# Patient Record
Sex: FEMALE | Race: WHITE | NOT HISPANIC OR LATINO | Employment: OTHER | URBAN - METROPOLITAN AREA
[De-identification: names, ages, dates, MRNs, and addresses within clinical notes are randomized per-mention and may not be internally consistent; named-entity substitution may affect disease eponyms.]

---

## 2017-01-13 ENCOUNTER — OFFICE VISIT - RIVER FALLS (OUTPATIENT)
Dept: FAMILY MEDICINE | Facility: CLINIC | Age: 47
End: 2017-01-13
Payer: MEDICAID

## 2017-01-13 ASSESSMENT — MIFFLIN-ST. JEOR: SCORE: 1275.66

## 2017-05-15 ENCOUNTER — OFFICE VISIT - RIVER FALLS (OUTPATIENT)
Dept: FAMILY MEDICINE | Facility: CLINIC | Age: 47
End: 2017-05-15
Payer: MEDICAID

## 2017-05-15 ENCOUNTER — COMMUNICATION - RIVER FALLS (OUTPATIENT)
Dept: FAMILY MEDICINE | Facility: CLINIC | Age: 47
End: 2017-05-15
Payer: MEDICAID

## 2017-05-15 ASSESSMENT — MIFFLIN-ST. JEOR: SCORE: 1238.46

## 2017-05-17 LAB
CREAT SERPL-MCNC: 0.77 MG/DL (ref 0.5–1.1)
GLUCOSE BLD-MCNC: 90 MG/DL (ref 65–99)

## 2017-08-03 ENCOUNTER — TELEPHONE (OUTPATIENT)
Dept: CARDIOLOGY | Facility: CLINIC | Age: 47
End: 2017-08-03

## 2017-08-03 NOTE — LETTER
August 3, 2017      TO: Lucrecia Connors  [address]  [facility] [address]       Dear Lucrecia,    Our records indicate that it is time for you to schedule your return visit with the [facility] in the CARDIOVASCULAR CONGENITAL CLINIC at the [facility] ([facility]).      To make your appointment, please call: [phone], Monday - Friday, 8 A.M. - 4:30 P.M (Central Time Zone).    Please check with your insurance company about your benefits.  Some insurance plans provide different coverage levels for services at [facility] hospital-based clinics.     We look forward to hearing from you.    Sincerely,    Herbert Garcia  Clinic Coordinator  [facility]  Office: [phone]  Fax: [phone]

## 2018-03-05 DIAGNOSIS — Q21.10 ASD (ATRIAL SEPTAL DEFECT): Primary | ICD-10-CM

## 2018-03-29 DIAGNOSIS — Q21.10 ASD (ATRIAL SEPTAL DEFECT): Primary | ICD-10-CM

## 2018-03-30 ENCOUNTER — HOSPITAL ENCOUNTER (OUTPATIENT)
Dept: MRI IMAGING | Facility: CLINIC | Age: 48
Discharge: HOME | End: 2018-03-30
Attending: INTERNAL MEDICINE | Admitting: INTERNAL MEDICINE
Payer: MEDICAID

## 2018-03-30 ENCOUNTER — OFFICE VISIT (OUTPATIENT)
Dept: CARDIOLOGY | Facility: CLINIC | Age: 48
End: 2018-03-30
Attending: INTERNAL MEDICINE
Payer: MEDICAID

## 2018-03-30 VITALS
WEIGHT: 144.5 LBS | HEART RATE: 74 BPM | DIASTOLIC BLOOD PRESSURE: 77 MMHG | BODY MASS INDEX: 24.07 KG/M2 | SYSTOLIC BLOOD PRESSURE: 116 MMHG | OXYGEN SATURATION: 98 % | HEIGHT: 65 IN

## 2018-03-30 DIAGNOSIS — Z87.74 H/O ATRIAL SEPTAL DEFECT REPAIR: ICD-10-CM

## 2018-03-30 DIAGNOSIS — Q21.10 ASD (ATRIAL SEPTAL DEFECT): ICD-10-CM

## 2018-03-30 DIAGNOSIS — I51.7 ENLARGED RV (RIGHT VENTRICLE): ICD-10-CM

## 2018-03-30 DIAGNOSIS — Q21.10 ASD (ATRIAL SEPTAL DEFECT): Primary | ICD-10-CM

## 2018-03-30 LAB
ALBUMIN SERPL-MCNC: 3.8 G/DL (ref 3.4–5)
ALP SERPL-CCNC: 54 U/L (ref 40–150)
ALT SERPL W P-5'-P-CCNC: 40 U/L (ref 0–50)
ANION GAP SERPL CALCULATED.3IONS-SCNC: 8 MMOL/L (ref 3–14)
AST SERPL W P-5'-P-CCNC: 29 U/L (ref 0–45)
BASOPHILS # BLD AUTO: 0.1 10E9/L (ref 0–0.2)
BASOPHILS NFR BLD AUTO: 1.3 %
BILIRUB SERPL-MCNC: 0.5 MG/DL (ref 0.2–1.3)
BUN SERPL-MCNC: 19 MG/DL (ref 7–30)
CALCIUM SERPL-MCNC: 9.1 MG/DL (ref 8.5–10.1)
CHLORIDE SERPL-SCNC: 107 MMOL/L (ref 94–109)
CHOLEST SERPL-MCNC: 116 MG/DL
CO2 SERPL-SCNC: 24 MMOL/L (ref 20–32)
CREAT SERPL-MCNC: 0.83 MG/DL (ref 0.52–1.04)
DIFFERENTIAL METHOD BLD: NORMAL
EOSINOPHIL # BLD AUTO: 0.1 10E9/L (ref 0–0.7)
EOSINOPHIL NFR BLD AUTO: 1.5 %
ERYTHROCYTE [DISTWIDTH] IN BLOOD BY AUTOMATED COUNT: 11.9 % (ref 10–15)
GFR SERPL CREATININE-BSD FRML MDRD: 74 ML/MIN/1.7M2
GLUCOSE SERPL-MCNC: 90 MG/DL (ref 70–99)
HCT VFR BLD AUTO: 42.5 % (ref 35–47)
HDLC SERPL-MCNC: 58 MG/DL
HGB BLD-MCNC: 14.1 G/DL (ref 11.7–15.7)
IMM GRANULOCYTES # BLD: 0 10E9/L (ref 0–0.4)
IMM GRANULOCYTES NFR BLD: 0.4 %
LDLC SERPL CALC-MCNC: 51 MG/DL
LYMPHOCYTES # BLD AUTO: 1.2 10E9/L (ref 0.8–5.3)
LYMPHOCYTES NFR BLD AUTO: 22.2 %
MCH RBC QN AUTO: 30.1 PG (ref 26.5–33)
MCHC RBC AUTO-ENTMCNC: 33.2 G/DL (ref 31.5–36.5)
MCV RBC AUTO: 91 FL (ref 78–100)
MONOCYTES # BLD AUTO: 0.5 10E9/L (ref 0–1.3)
MONOCYTES NFR BLD AUTO: 9.7 %
NEUTROPHILS # BLD AUTO: 3.5 10E9/L (ref 1.6–8.3)
NEUTROPHILS NFR BLD AUTO: 64.9 %
NONHDLC SERPL-MCNC: 59 MG/DL
NRBC # BLD AUTO: 0 10*3/UL
NRBC BLD AUTO-RTO: 0 /100
PLATELET # BLD AUTO: 194 10E9/L (ref 150–450)
POTASSIUM SERPL-SCNC: 4 MMOL/L (ref 3.4–5.3)
PROT SERPL-MCNC: 7.9 G/DL (ref 6.8–8.8)
RBC # BLD AUTO: 4.68 10E12/L (ref 3.8–5.2)
SODIUM SERPL-SCNC: 139 MMOL/L (ref 133–144)
T4 FREE SERPL-MCNC: 1.24 NG/DL (ref 0.76–1.46)
TRIGL SERPL-MCNC: 41 MG/DL
TSH SERPL DL<=0.005 MIU/L-ACNC: 4.16 MU/L (ref 0.4–4)
WBC # BLD AUTO: 5.4 10E9/L (ref 4–11)

## 2018-03-30 PROCEDURE — 75561 CARDIAC MRI FOR MORPH W/DYE: CPT

## 2018-03-30 PROCEDURE — A9585 GADOBUTROL INJECTION: HCPCS | Performed by: INTERNAL MEDICINE

## 2018-03-30 PROCEDURE — 85025 COMPLETE CBC W/AUTO DIFF WBC: CPT | Performed by: INTERNAL MEDICINE

## 2018-03-30 PROCEDURE — 84439 ASSAY OF FREE THYROXINE: CPT | Performed by: INTERNAL MEDICINE

## 2018-03-30 PROCEDURE — 75565 CARD MRI VELOC FLOW MAPPING: CPT | Mod: 26 | Performed by: INTERNAL MEDICINE

## 2018-03-30 PROCEDURE — 93010 ELECTROCARDIOGRAM REPORT: CPT | Mod: ZP | Performed by: INTERNAL MEDICINE

## 2018-03-30 PROCEDURE — 99214 OFFICE O/P EST MOD 30 MIN: CPT | Mod: GC | Performed by: INTERNAL MEDICINE

## 2018-03-30 PROCEDURE — G0463 HOSPITAL OUTPT CLINIC VISIT: HCPCS | Mod: 25,ZF

## 2018-03-30 PROCEDURE — 93005 ELECTROCARDIOGRAM TRACING: CPT | Mod: ZF

## 2018-03-30 PROCEDURE — 25000128 H RX IP 250 OP 636: Performed by: INTERNAL MEDICINE

## 2018-03-30 PROCEDURE — 84443 ASSAY THYROID STIM HORMONE: CPT | Performed by: INTERNAL MEDICINE

## 2018-03-30 PROCEDURE — 80053 COMPREHEN METABOLIC PANEL: CPT | Performed by: INTERNAL MEDICINE

## 2018-03-30 PROCEDURE — 75561 CARDIAC MRI FOR MORPH W/DYE: CPT | Mod: 26 | Performed by: INTERNAL MEDICINE

## 2018-03-30 PROCEDURE — 80061 LIPID PANEL: CPT | Performed by: INTERNAL MEDICINE

## 2018-03-30 PROCEDURE — 36415 COLL VENOUS BLD VENIPUNCTURE: CPT | Performed by: INTERNAL MEDICINE

## 2018-03-30 RX ORDER — GADOBUTROL 604.72 MG/ML
10 INJECTION INTRAVENOUS ONCE
Status: COMPLETED | OUTPATIENT
Start: 2018-03-30 | End: 2018-03-30

## 2018-03-30 RX ADMIN — GADOBUTROL 10 ML: 604.72 INJECTION INTRAVENOUS at 08:18

## 2018-03-30 ASSESSMENT — PAIN SCALES - GENERAL: PAINLEVEL: NO PAIN (0)

## 2018-03-30 NOTE — PROGRESS NOTES
HPI:    Mrs. Connors is a 47-year-old females with past medical history of atrial septal defect status post surgical patch repair in 1993 at 23 years of age. She was initially diagnosed after auscultation of heart murmur prompted an echocardiogram which revealed her atrial septal defect.  Her surgical course was uncomplicated and she was subsequently discharged.  She was unaware that she may require cardiology follow-up following surgical repair and as such had not been evaluated to cardiology for a long period of time until establishing care with Dr. Valerio.  She presents to she is otherwise in good health [facility] clinic today for follow-up of atrial septal defect status post repair.  And only other comorbidity is Crohn's colitis for which she does not wish to see gastroenterology and is managed with nutrition alone.  There has been a discussion with a genetic counselor in the past regarding genetic testing for conditions that may be associated with atrial septal defects given that she has had 4 children now with ASDs and that 3 of them have subsequently required device closure, however this has been deferred so far. She denies any chest pain, shortness of breath, dyspnea on exertion, paroxysmal nocturnal dyspnea, orthopnea, lower extremity edema, palpitations, dizziness, lightheadedness, syncope.  She does not feel limited in the degree of exercise that she is able to perform.  However, he does not perform dedicated exercise.       PAST MEDICAL HISTORY:  Past Medical History:   Diagnosis Date     ASD (atrial septal defect)     s/p patch repair in 1993     Contact dermatitis and other eczema, due to unspecified cause    - Chron's Disease    Family History   Problem Relation Age of Onset     HEART DISEASE Son      ASD     HEART DISEASE Daughter      ASD     HEART DISEASE Son      ASD     CURRENT MEDICATIONS:    No current outpatient prescriptions on file prior to visit.  Current Facility-Administered Medications on File  "Prior to Visit:  [COMPLETED] gadobutrol (GADAVIST) injection 10 mL     NKDA    ROS:   Per HPI, otherwise reviewed and negative x 12.      EXAM:  Vitals: /77  Pulse 74  Ht 5' 5\" (165.1 cm)  Wt 144 lb 8 oz (65.5 kg)  SpO2 98%  BMI 24.05 kg/m2  General: appears comfortable, alert  Head: normocephalic, atraumatic  Eyes: anicteric sclera, EOMI  Neck: no adenopathy or masses, 2+ carotids without bruits  Orophyarynx: moist mucosa, no lesions, dentition intact  Heart: regular, S1/S2, short 2/6, no murmur, gallop, or rub, estimated JVP 6 cm  Lungs: clear, no rales or wheezing  Abdomen: soft, non-tender, bowel sounds present, no hepatomegaly  Extremities: no clubbing, cyanosis or edema  Neurological: normal speech and affect, no gross motor deficits    Labs:  Orders Only on 03/30/2018   Component Date Value Ref Range Status     TSH 03/30/2018 4.16* 0.40 - 4.00 mU/L Final     Sodium 03/30/2018 139  133 - 144 mmol/L Final     Potassium 03/30/2018 4.0  3.4 - 5.3 mmol/L Final     Chloride 03/30/2018 107  94 - 109 mmol/L Final     Carbon Dioxide 03/30/2018 24  20 - 32 mmol/L Final     Anion Gap 03/30/2018 8  3 - 14 mmol/L Final     Glucose 03/30/2018 90  70 - 99 mg/dL Final     Urea Nitrogen 03/30/2018 19  7 - 30 mg/dL Final     Creatinine 03/30/2018 0.83  0.52 - 1.04 mg/dL Final     GFR Estimate 03/30/2018 74  >60 mL/min/1.7m2 Final    Non  GFR Calc     GFR Estimate If Black 03/30/2018 89  >60 mL/min/1.7m2 Final    African American GFR Calc     Calcium 03/30/2018 9.1  8.5 - 10.1 mg/dL Final     Bilirubin Total 03/30/2018 0.5  0.2 - 1.3 mg/dL Final     Albumin 03/30/2018 3.8  3.4 - 5.0 g/dL Final     Protein Total 03/30/2018 7.9  6.8 - 8.8 g/dL Final     Alkaline Phosphatase 03/30/2018 54  40 - 150 U/L Final     ALT 03/30/2018 40  0 - 50 U/L Final     AST 03/30/2018 29  0 - 45 U/L Final     WBC 03/30/2018 5.4  4.0 - 11.0 10e9/L Final     RBC Count 03/30/2018 4.68  3.8 - 5.2 10e12/L Final     Hemoglobin " 03/30/2018 14.1  11.7 - 15.7 g/dL Final     Hematocrit 03/30/2018 42.5  35.0 - 47.0 % Final     MCV 03/30/2018 91  78 - 100 fl Final     MCH 03/30/2018 30.1  26.5 - 33.0 pg Final     MCHC 03/30/2018 33.2  31.5 - 36.5 g/dL Final     RDW 03/30/2018 11.9  10.0 - 15.0 % Final     Platelet Count 03/30/2018 194  150 - 450 10e9/L Final     Diff Method 03/30/2018 Automated Method   Final     % Neutrophils 03/30/2018 64.9  % Final     % Lymphocytes 03/30/2018 22.2  % Final     % Monocytes 03/30/2018 9.7  % Final     % Eosinophils 03/30/2018 1.5  % Final     % Basophils 03/30/2018 1.3  % Final     % Immature Granulocytes 03/30/2018 0.4  % Final     Nucleated RBCs 03/30/2018 0  0 /100 Final     Absolute Neutrophil 03/30/2018 3.5  1.6 - 8.3 10e9/L Final     Absolute Lymphocytes 03/30/2018 1.2  0.8 - 5.3 10e9/L Final     Absolute Monocytes 03/30/2018 0.5  0.0 - 1.3 10e9/L Final     Absolute Eosinophils 03/30/2018 0.1  0.0 - 0.7 10e9/L Final     Absolute Basophils 03/30/2018 0.1  0.0 - 0.2 10e9/L Final     Abs Immature Granulocytes 03/30/2018 0.0  0 - 0.4 10e9/L Final     Absolute Nucleated RBC 03/30/2018 0.0   Final     Cholesterol 03/30/2018 116  <200 mg/dL Final     Triglycerides 03/30/2018 41  <150 mg/dL Final     HDL Cholesterol 03/30/2018 58  >49 mg/dL Final     LDL Cholesterol Calculated 03/30/2018 51  <100 mg/dL Final    Desirable:       <100 mg/dl     Non HDL Cholesterol 03/30/2018 59  <130 mg/dL Final     T4 Free 03/30/2018 1.24  0.76 - 1.46 ng/dL Final       Echo (4/8/14):   Status post surgical closure of atrial septal defect. Mild right   ventricular enlargement. Normal ventricular contractility. Mild   (1-2+) tricuspid insufficiency with normal right sided pressures. No   evidence of shunts. No evidence of pulmonary hypertension    MRI (3/30/18):  Preliminary read: Normal left ventricular systolic function, normal right ventricular systolic function, right ventricular enlargement is present.  Mitral valve prolapse  is present without significant regurgitation.    EKG (3/30/18): Normal sinus rhythm    Assessment and Plan:    Mrs. Connors is a 47-year-old female with past medical history of a atrial septal defect status post surgical repair and mitral valve prolapse in 1993 who is clinically doing well.  There is no signs of pulmonary hypertension on most recent echocardiogram and she is not complaining of any symptoms consistent with arrhythmia.  Her most recent holter monitor was lost and as such we'd like to replace one to evaluate for arrythmias for which she is at risk following her ASD repair.  Mrs. Connors has declined genetics testing, however we recommended informing her children that a genetic predisposition may exist given the high frequency of ASDs present in her family and that her children should be offered the opportunity for genetic testing.  She does not need longterm adult congenital follow up unless any questions arise, however she should continue to follow up with a cardiologist given her mitral valve prolapse for which we recommend repeat echocardiogram in 5 years.      Dallin Sarabia DO  Pediatric Cardiology Fellow    I have reviewed today's vital signs, notes, medications, labs and imaging. I have also seen and examined the patient and agree with the findings and plan as outlined above.    Latesha Barnard MD  Section Head - Advanced Heart Failure, Transplantation and Mechanical Circulatory Support  Co-Director - Adult Congenital and Cardiovascular Genetics Center  Associate Professor of Medicine, [employer] [facility]

## 2018-03-30 NOTE — NURSING NOTE
Cardiac Testing: Patient given instructions regarding  echocardiogram . Discussed purpose, preparation, procedure and when to expect results reported back to the patient. Patient demonstrated understanding of this information and agreed to call with further questions or concerns.    Labs: Patient was given results of the laboratory testing obtained today. Patient demonstrated understanding of this information and agreed to call with further questions or concerns.     Med Reconcile: Reviewed and verified all current medications with the patient. The updated medication list was printed and given to the patient.    Return Appointment: Follow up with general cardiology in 5 years with an echo. Patient given instructions regarding scheduling next clinic visit. Patient demonstrated understanding of this information and agreed to call with further questions or concerns.    Patient stated she understood all health information given and agreed to call with further questions or concerns.    Cameron Leiva, RN  RN Care Coordinator  [facility]  [phone]

## 2018-03-30 NOTE — LETTER
3/30/2018      RE: Lucrecia Connors  [address]  [facility] [address]       Dear Colleague,    Thank you for the opportunity to participate in the care of your patient, Lucrecia Connors, at the [facility]. Please see a copy of my visit note below.    HPI:    Mrs. Connors is a 47-year-old females with past medical history of atrial septal defect status post surgical patch repair in 1993 at 23 years of age. She was initially diagnosed after auscultation of heart murmur prompted an echocardiogram which revealed her atrial septal defect.  Her surgical course was uncomplicated and she was subsequently discharged.  She was unaware that she may require cardiology follow-up following surgical repair and as such had not been evaluated to cardiology for a long period of time until establishing care with Dr. Valerio.  She presents to she is otherwise in good health [facility] clinic today for follow-up of atrial septal defect status post repair.  And only other comorbidity is Crohn's colitis for which she does not wish to see gastroenterology and is managed with nutrition alone.  There has been a discussion with a genetic counselor in the past regarding genetic testing for conditions that may be associated with atrial septal defects given that she has had 4 children now with ASDs and that 3 of them have subsequently required device closure, however this has been deferred so far. She denies any chest pain, shortness of breath, dyspnea on exertion, paroxysmal nocturnal dyspnea, orthopnea, lower extremity edema, palpitations, dizziness, lightheadedness, syncope.  She does not feel limited in the degree of exercise that she is able to perform.  However, he does not perform dedicated exercise.       PAST MEDICAL HISTORY:  Past Medical History:   Diagnosis Date     ASD (atrial septal defect)     s/p patch repair in 1993     Contact dermatitis and other eczema, due to  "unspecified cause    - Chron's Disease    Family History   Problem Relation Age of Onset     HEART DISEASE Son      ASD     HEART DISEASE Daughter      ASD     HEART DISEASE Son      ASD     CURRENT MEDICATIONS:    No current outpatient prescriptions on file prior to visit.  Current Facility-Administered Medications on File Prior to Visit:  [COMPLETED] gadobutrol (GADAVIST) injection 10 mL     NKDA    ROS:   Per HPI, otherwise reviewed and negative x 12.      EXAM:  Vitals: /77  Pulse 74  Ht 5' 5\" (165.1 cm)  Wt 144 lb 8 oz (65.5 kg)  SpO2 98%  BMI 24.05 kg/m2  General: appears comfortable, alert  Head: normocephalic, atraumatic  Eyes: anicteric sclera, EOMI  Neck: no adenopathy or masses, 2+ carotids without bruits  Orophyarynx: moist mucosa, no lesions, dentition intact  Heart: regular, S1/S2, short 2/6, no murmur, gallop, or rub, estimated JVP 6 cm  Lungs: clear, no rales or wheezing  Abdomen: soft, non-tender, bowel sounds present, no hepatomegaly  Extremities: no clubbing, cyanosis or edema  Neurological: normal speech and affect, no gross motor deficits    Labs:  Orders Only on 03/30/2018   Component Date Value Ref Range Status     TSH 03/30/2018 4.16* 0.40 - 4.00 mU/L Final     Sodium 03/30/2018 139  133 - 144 mmol/L Final     Potassium 03/30/2018 4.0  3.4 - 5.3 mmol/L Final     Chloride 03/30/2018 107  94 - 109 mmol/L Final     Carbon Dioxide 03/30/2018 24  20 - 32 mmol/L Final     Anion Gap 03/30/2018 8  3 - 14 mmol/L Final     Glucose 03/30/2018 90  70 - 99 mg/dL Final     Urea Nitrogen 03/30/2018 19  7 - 30 mg/dL Final     Creatinine 03/30/2018 0.83  0.52 - 1.04 mg/dL Final     GFR Estimate 03/30/2018 74  >60 mL/min/1.7m2 Final    Non  GFR Calc     GFR Estimate If Black 03/30/2018 89  >60 mL/min/1.7m2 Final    African American GFR Calc     Calcium 03/30/2018 9.1  8.5 - 10.1 mg/dL Final     Bilirubin Total 03/30/2018 0.5  0.2 - 1.3 mg/dL Final     Albumin 03/30/2018 3.8  3.4 - " 5.0 g/dL Final     Protein Total 03/30/2018 7.9  6.8 - 8.8 g/dL Final     Alkaline Phosphatase 03/30/2018 54  40 - 150 U/L Final     ALT 03/30/2018 40  0 - 50 U/L Final     AST 03/30/2018 29  0 - 45 U/L Final     WBC 03/30/2018 5.4  4.0 - 11.0 10e9/L Final     RBC Count 03/30/2018 4.68  3.8 - 5.2 10e12/L Final     Hemoglobin 03/30/2018 14.1  11.7 - 15.7 g/dL Final     Hematocrit 03/30/2018 42.5  35.0 - 47.0 % Final     MCV 03/30/2018 91  78 - 100 fl Final     MCH 03/30/2018 30.1  26.5 - 33.0 pg Final     MCHC 03/30/2018 33.2  31.5 - 36.5 g/dL Final     RDW 03/30/2018 11.9  10.0 - 15.0 % Final     Platelet Count 03/30/2018 194  150 - 450 10e9/L Final     Diff Method 03/30/2018 Automated Method   Final     % Neutrophils 03/30/2018 64.9  % Final     % Lymphocytes 03/30/2018 22.2  % Final     % Monocytes 03/30/2018 9.7  % Final     % Eosinophils 03/30/2018 1.5  % Final     % Basophils 03/30/2018 1.3  % Final     % Immature Granulocytes 03/30/2018 0.4  % Final     Nucleated RBCs 03/30/2018 0  0 /100 Final     Absolute Neutrophil 03/30/2018 3.5  1.6 - 8.3 10e9/L Final     Absolute Lymphocytes 03/30/2018 1.2  0.8 - 5.3 10e9/L Final     Absolute Monocytes 03/30/2018 0.5  0.0 - 1.3 10e9/L Final     Absolute Eosinophils 03/30/2018 0.1  0.0 - 0.7 10e9/L Final     Absolute Basophils 03/30/2018 0.1  0.0 - 0.2 10e9/L Final     Abs Immature Granulocytes 03/30/2018 0.0  0 - 0.4 10e9/L Final     Absolute Nucleated RBC 03/30/2018 0.0   Final     Cholesterol 03/30/2018 116  <200 mg/dL Final     Triglycerides 03/30/2018 41  <150 mg/dL Final     HDL Cholesterol 03/30/2018 58  >49 mg/dL Final     LDL Cholesterol Calculated 03/30/2018 51  <100 mg/dL Final    Desirable:       <100 mg/dl     Non HDL Cholesterol 03/30/2018 59  <130 mg/dL Final     T4 Free 03/30/2018 1.24  0.76 - 1.46 ng/dL Final       Echo (4/8/14):   Status post surgical closure of atrial septal defect. Mild right   ventricular enlargement. Normal ventricular contractility.  Mild   (1-2+) tricuspid insufficiency with normal right sided pressures. No   evidence of shunts. No evidence of pulmonary hypertension    MRI (3/30/18):  Preliminary read: Normal left ventricular systolic function, normal right ventricular systolic function, right ventricular enlargement is present.  Mitral valve prolapse is present without significant regurgitation.    EKG (3/30/18): Normal sinus rhythm    Assessment and Plan:    Mrs. Connors is a 47-year-old female with past medical history of a atrial septal defect status post surgical repair and mitral valve prolapse in 1993 who is clinically doing well.  There is no signs of pulmonary hypertension on most recent echocardiogram and she is not complaining of any symptoms consistent with arrhythmia.  Her most recent holter monitor was lost and as such we'd like to replace one to evaluate for arrythmias for which she is at risk following her ASD repair.  Mrs. Connors has declined genetics testing, however we recommended informing her children that a genetic predisposition may exist given the high frequency of ASDs present in her family and that her children should be offered the opportunity for genetic testing.  She does not need longterm adult congenital follow up unless any questions arise, however she should continue to follow up with a cardiologist given her mitral valve prolapse for which we recommend repeat echocardiogram in 5 years.      Dallin Sarabia DO  Pediatric Cardiology Fellow    I have reviewed today's vital signs, notes, medications, labs and imaging. I have also seen and examined the patient and agree with the findings and plan as outlined above.    Latesha Barnard MD  Section Head - Advanced Heart Failure, Transplantation and Mechanical Circulatory Support  Co-Director - Adult Congenital and Cardiovascular Genetics Center  Associate Professor of Medicine, [employer] [facility]

## 2018-03-30 NOTE — MR AVS SNAPSHOT
"              After Visit Summary   3/30/2018    Lucrecia Connors    MRN: [id number]           Patient Information     Date Of Birth          1970        Visit Information        Provider Department      3/30/2018 10:00 AM Latesha Barnard MD [facility]        Today's Diagnoses     ASD (atrial septal defect)    -  1      Care Instructions    You were seen today in the Adult Congenital and Cardiovascular Genetics Clinic at the [facility].    Cardiology Providers you saw during your visit:  Dr Latesha Barnard    Diagnosis:  History of ASD    Results:  Dr Barnard reviewed the results of your labs and preliminary results of your cardiac MRI- Your RV size is mildly enlarged but overall hear function is good.  It does show evidence of mitral valve prolapse which will need to be monitored.    Recommendations:    1.  Continue to eat a heart healthy, low salt diet.  2.  Continue to get 20-30 minutes of aerobic activity, 4-5 days per week.  Examples of aerobic activity include walking, running, swimming, cycling, etc.  3.  Continue to observe good oral hygiene, with regular dental visits.  4.  If you would like to purse genetic testing given your family history, that would be warranted. It's also important to educate your children that this is a genetic condition so they are aware when they decide to have children.        Vitals:    03/30/18 1003   BP: 116/77   Pulse: 74   SpO2: 98%   Weight: 65.5 kg (144 lb 8 oz)   Height: 1.651 m (5' 5\")       SBE prophylaxis:   Yes____  No__x__    Lifelong Bacterial Endocarditis Prophylaxis:  YES____  NO____    If YES is checked, follow the recommendations outlined below:   1. Take antibiotic(s) prior to recommended dental procedures and procedures on the respiratory tract or with infected skin, muscle or bones. SBE prophylaxis is not needed for routine GI and  procedures (ie. Colonoscopy or vaginal delivery)   2. Observe good oral hygiene daily, as advised by " your dentist. Get regular professional dental care.   3. Keep cuts clean.   4. Infections should be treated promptly.   5. Symptoms of Infective Endocarditis could include: fever lasting more than 4-5 days or a recurrent fever that initially resolves but returns within 1-2 days)     Exercise restrictions:   Yes____  No__x__         If yes, list restrictions:  Must be allowed to rest if fatigued or SOB      Work restrictions:  Yes____  No__x__         If yes, list restrictions:    FASTING CHOLESTEROL was checked in the last 5 years YES__x_  NO___  2018  Continue to eat a heart healthy, low salt diet.         ____ Fasting lipid panel order today         ____ No changes in medications          ____ I recommend the following changes in your cholesterol medications.:          ____ Please follow up for cholesterol screening at your primary care physician      Follow-up:  Follow up with general cardiology in 5 years with an echo and possible holter monitor.  Please call to be seen sooner if you have any increase in palpitations, exercise intolerance or shortness of breath.     For after hours urgent needs, call [phone] and ask to speak to the Adult Congenital Physician on call.  Mention Job Code 0401.    For emergencies call [phone].    For any scheduling needs, please call Herbert Garcia, Procedure , at [phone]  Thank you for your visit today!  If you have questions or concerns about today's visit, please call me.    Cameron Leiva, RN, BSN  Cardiology Care Coordinator  [facility]  [phone]     [facility]  Mail Code [id number]  [place] [address]            Follow-ups after your visit        Who to contact     If you have questions or need follow up information about today's clinic visit or your schedule please contact [facility] directly at [phone].  Normal or non-critical lab and imaging results will be communicated to you by MyChart, letter or  "phone within 4 business days after the clinic has received the results. If you do not hear from us within 7 days, please contact the clinic through [employer] or phone. If you have a critical or abnormal lab result, we will notify you by phone as soon as possible.  Submit refill requests through [employer] or call your pharmacy and they will forward the refill request to us. Please allow 3 business days for your refill to be completed.          Additional Information About Your Visit        "[email or web address] Information     [employer] gives you secure access to your electronic health record. If you see a primary care provider, you can also send messages to your care team and make appointments. If you have questions, please call your primary care clinic.  If you do not have a primary care provider, please call [phone] and they will assist you.        Care EveryWhere ID     This is your Care EveryWhere ID. This could be used by other organizations to access your [facility] medical records  [id number]        Your Vitals Were     Pulse Height Pulse Oximetry BMI (Body Mass Index)          74 1.651 m (5' 5\") 98% 24.05 kg/m2         Blood Pressure from Last 3 Encounters:   03/30/18 116/77   04/08/14 97/52   05/26/10 115/56    Weight from Last 3 Encounters:   03/30/18 65.5 kg (144 lb 8 oz)   04/08/14 69.5 kg (153 lb 3.2 oz)   10/31/08 69 kg (152 lb 1 oz)              We Performed the Following     EKG 12-lead, tracing only (Same Day)     Follow-Up with Cardiologist        Primary Care Provider Office Phone #    Dorie Lewis [phone]       XXX NO INFO FOUND XXX  [facility] [address]        Equal Access to Services     [facility]: Hadii quinton connelly hadtroy [place], waaxda luqadaha, qaybta kaalaj otto. So [facility] [place] [phone].    ATENCIÓN: Si habla español, tiene a weller disposición servicios gratuitos de asistencia lingüística. Llame al [phone].    We comply with applicable federal " civil rights laws and [place] laws. We do not discriminate on the basis of race, color, national origin, age, disability, sex, sexual orientation, or gender identity.            Thank you!     Thank you for choosing [facility]  for your care. Our goal is always to provide you with excellent care. Hearing back from our patients is one way we can continue to improve our services. Please take a few minutes to complete the written survey that you may receive in the mail after your visit with us. Thank you!             Your Updated Medication List - Protect others around you: Learn how to safely use, store and throw away your medicines at www.disposemymeds.org.      Notice  As of 3/30/2018 11:23 AM    You have not been prescribed any medications.

## 2018-03-30 NOTE — PATIENT INSTRUCTIONS
"You were seen today in the Adult Congenital and Cardiovascular Genetics Clinic at [facility].    Cardiology Providers you saw during your visit:  Dr Latesha Barnard    Diagnosis:  History of ASD    Results:  Dr Barnard reviewed the results of your labs and preliminary results of your cardiac MRI- Your RV size is mildly enlarged but overall hear function is good.  It does show evidence of mitral valve prolapse which will need to be monitored.    Recommendations:    1.  Continue to eat a heart healthy, low salt diet.  2.  Continue to get 20-30 minutes of aerobic activity, 4-5 days per week.  Examples of aerobic activity include walking, running, swimming, cycling, etc.  3.  Continue to observe good oral hygiene, with regular dental visits.  4.  If you would like to purse genetic testing given your family history, that would be warranted. It's also important to educate your children that this is a genetic condition so they are aware when they decide to have children.        Vitals:    03/30/18 1003   BP: 116/77   Pulse: 74   SpO2: 98%   Weight: 65.5 kg (144 lb 8 oz)   Height: 1.651 m (5' 5\")       SBE prophylaxis:   Yes____  No__x__    Lifelong Bacterial Endocarditis Prophylaxis:  YES____  NO____    If YES is checked, follow the recommendations outlined below:   1. Take antibiotic(s) prior to recommended dental procedures and procedures on the respiratory tract or with infected skin, muscle or bones. SBE prophylaxis is not needed for routine GI and  procedures (ie. Colonoscopy or vaginal delivery)   2. Observe good oral hygiene daily, as advised by your dentist. Get regular professional dental care.   3. Keep cuts clean.   4. Infections should be treated promptly.   5. Symptoms of Infective Endocarditis could include: fever lasting more than 4-5 days or a recurrent fever that initially resolves but returns within 1-2 days)     Exercise restrictions:   Yes____  No__x__         If yes, list restrictions:  " Must be allowed to rest if fatigued or SOB      Work restrictions:  Yes____  No__x__         If yes, list restrictions:    FASTING CHOLESTEROL was checked in the last 5 years YES__x_  NO___  2018  Continue to eat a heart healthy, low salt diet.         ____ Fasting lipid panel order today         ____ No changes in medications          ____ I recommend the following changes in your cholesterol medications.:          ____ Please follow up for cholesterol screening at your primary care physician      Follow-up:  Follow up with general cardiology in 5 years with an echo and possible holter monitor.  Please call to be seen sooner if you have any increase in palpitations, exercise intolerance or shortness of breath.     For after hours urgent needs, call [phone] and ask to speak to the Adult Congenital Physician on call.  Mention Job Code 0401.    For emergencies call [phone].    For any scheduling needs, please call Herbert Garcia, Procedure , at [phone]  Thank you for your visit today!  If you have questions or concerns about today's visit, please call me.    Cameron Leiva, RN, BSN  Cardiology Care Coordinator  [facility]  [phone]    [address] [place] [address]  Mail Code [id number]  [place] [address]

## 2018-03-30 NOTE — NURSING NOTE
Chief Complaint   Patient presents with     New Patient     47 yr old female with h/o ASD (s/p patch repair in 1993) presenting for follow up      Vitals were taken and medications were reconciled. EKG was performed    Renata SAUCEDA  10:05 AM

## 2018-03-31 ENCOUNTER — HEALTH MAINTENANCE LETTER (OUTPATIENT)
Age: 48
End: 2018-03-31

## 2018-04-02 LAB — INTERPRETATION ECG - MUSE: NORMAL

## 2018-04-24 ENCOUNTER — OFFICE VISIT - RIVER FALLS (OUTPATIENT)
Dept: FAMILY MEDICINE | Facility: CLINIC | Age: 48
End: 2018-04-24
Payer: MEDICAID

## 2018-04-24 ASSESSMENT — MIFFLIN-ST. JEOR: SCORE: 1266.58

## 2019-11-03 ENCOUNTER — HEALTH MAINTENANCE LETTER (OUTPATIENT)
Age: 49
End: 2019-11-03

## 2020-11-16 ENCOUNTER — HEALTH MAINTENANCE LETTER (OUTPATIENT)
Age: 50
End: 2020-11-16

## 2021-02-07 ENCOUNTER — HEALTH MAINTENANCE LETTER (OUTPATIENT)
Age: 51
End: 2021-02-07

## 2021-05-26 ENCOUNTER — RECORDS - HEALTHEAST (OUTPATIENT)
Dept: ADMINISTRATIVE | Facility: CLINIC | Age: 51
End: 2021-05-26

## 2021-09-18 ENCOUNTER — HEALTH MAINTENANCE LETTER (OUTPATIENT)
Age: 51
End: 2021-09-18

## 2022-01-08 ENCOUNTER — HEALTH MAINTENANCE LETTER (OUTPATIENT)
Age: 52
End: 2022-01-08

## 2022-02-12 VITALS
TEMPERATURE: 97.5 F | BODY MASS INDEX: 24.16 KG/M2 | DIASTOLIC BLOOD PRESSURE: 74 MMHG | HEIGHT: 65 IN | HEART RATE: 59 BPM | SYSTOLIC BLOOD PRESSURE: 115 MMHG | WEIGHT: 145 LBS

## 2022-02-12 VITALS
WEIGHT: 136.8 LBS | HEIGHT: 65 IN | HEART RATE: 76 BPM | DIASTOLIC BLOOD PRESSURE: 62 MMHG | TEMPERATURE: 99 F | SYSTOLIC BLOOD PRESSURE: 104 MMHG | BODY MASS INDEX: 22.79 KG/M2

## 2022-02-12 VITALS
BODY MASS INDEX: 23.82 KG/M2 | TEMPERATURE: 100.9 F | WEIGHT: 143 LBS | OXYGEN SATURATION: 97 % | HEART RATE: 87 BPM | HEIGHT: 65 IN

## 2022-02-15 NOTE — PROGRESS NOTES
Patient:   MARTHA DOE            MRN: [id number]            FIN: [id number]               Age:   47 years     Sex:  Female     :  1970   Associated Diagnoses:   Influenza   Author:   Ismael Ko MD      Impression and Plan   Diagnosis     Influenza (EIW87-OV J11.1).     Course:  Worsening.    Orders     Orders   Charges (Evaluation and Management):  20846 office outpatient visit 15 minutes (Charge) (Order): Quantity: 1, Influenza.     Orders (Selected)   Prescriptions  Prescribed  Tamiflu 75 mg oral capsule: 1 cap(s) ( 75 mg ), PO, BID, # 10 cap(s), 0 Refill(s), Type: Maintenance, Pharmacy: [facility], 1 cap(s) po bid,x5 day(s).        Visit Information      Date of Service: 2018 10:41 am  Performing Location: [facility]  Encounter#: [id number]      Primary Care Provider (PCP):  Ismael Ko MD    NPI# [id number]   Visit type:  New symptom.    Accompanied by:  Family member.    Source of history:  Self, Family member.    History limitation:  None.       Chief Complaint   Chief complaint discussed and confirmed correct.     2018 10:47 AM CDT   Pt here for cold/congestion and cough        History of Present Illness             The patient presents with symptoms of an upper respiratory infection.  The symptoms of the upper respiratory infection are described as rhinorrhea, sore throat, nasal congestion and cough.  The severity of the symptoms associated to the upper respiratory infection is severe.  The timing/course of upper respiratory infection symptoms is constant.  The symptoms of upper respiratory infection have lasted for 2 day(s).  The context of the upper respiratory infection symptoms: occurred in association with illness.  Associated symptoms consist of fatigue, fever and headache.        Review of Systems   Constitutional:  Fever, Weakness, Fatigue, Decreased activity.    Eye:  Negative.    Ear/Nose/Mouth/Throat:  Nasal congestion, Sore throat.     Respiratory:  Cough.    Cardiovascular:  Negative.    Gastrointestinal:  Negative.    Genitourinary:  Negative.    Hematology/Lymphatics:  Negative.    Endocrine:  Negative.    Immunologic:  Negative.    Musculoskeletal:  Joint pain, Muscle pain.    Integumentary:  Negative.    Neurologic:  Negative.    Psychiatric:  Negative.    All other systems reviewed and negative      Health Status   Allergies:    Allergic Reactions (Selected)  Severity Not Documented  Augmentin (No reactions were documented)  Triple Antibiotic (No reactions were documented)  Triple antibiotic ointment (No reactions were documented)   Medications:  (Selected)   Prescriptions  Prescribed  Tamiflu 75 mg oral capsule: 1 cap(s) ( 75 mg ), PO, BID, # 10 cap(s), 0 Refill(s), Type: Maintenance, Pharmacy: [facility], 1 cap(s) po bid,x5 day(s)   Problem list:    All Problems  Crohn's Colitis / ICD-9-.1 / Confirmed  Crohn's disease / ICD-9-.9 / Confirmed  Pes planus / SNOMED CT 552199652 / Confirmed  Restless legs syndrome / SNOMED CT 95234273 / Confirmed  Twin gestation, dichorionic diamniotic / ICD-9-CM V91.03 / Confirmed  Twin pregnancy / SNOMED CT 059754723 / Confirmed   (vaginal birth after ) / SNOMED CT 324937369 / Confirmed  Resolved: ASD (Atrial Septal Defect) / ICD-9-.5  Resolved: Pregnancy / SNOMED CT 383214968  Resolved: Pregnancy / SNOMED CT 620948844  Resolved: Pregnancy / SNOMED CT 691269971  Resolved: Pregnancy / SNOMED CT 075724288  Resolved: Pregnancy / SNOMED CT 783474390  Resolved: Pregnancy / SNOMED CT 369951285  Resolved: Pregnancy / SNOMED CT 357806733      Histories   Past Medical History:    Active  Crohn's Colitis (555.1): Onset in  at 32 years.  Crohn's disease (555.9)  Resolved  ASD (Atrial Septal Defect) (745.5): Onset in  at 23 years.  Resolved.  Pregnancy (464651705):  Resolved on 1999 at 28 years.  Pregnancy (311622931):  Resolved on 3/23/2001 at 30 years.  Pregnancy  (626440508):  Resolved on 10/12/2004 at 34 years.  Pregnancy (112243866):  Resolved on 3/18/2006 at 35 years.  Pregnancy (741171319):  Resolved on 2007 at 37 years.  Pregnancy (251880322):  Resolved on 2009 at 38 years.  Pregnancy (819733174):  Resolved on 7/15/2012 at 42 years.   Family History:    CHF - Congestive heart failure  Father (Daniel)  Pacemaker  Father (Daniel)  Polycystic kidney disease.  Mother (Enedelia)     Procedure history:    History of atrial septal defect repair (ICD-9-CM V15.1) in the month of 1994 at 24 Years.   section (SNOMED CT 14369172).   (vaginal birth after ) (SNOMED CT [id number]-[id number]).  Comments:  2/3/2012 11:48 AM - Tanya Grande 3  Extraction of wisdom tooth (SNOMED CT 072048816).  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      Home and Environment Assessment            Risks in environment: Does not wear helmet.            Marital status: .  Spouse/Partner name: Alexander Connors.  Lives with Children, Spouse.  6 children.               Living situation: Home/Independent.      Nutrition and Health Assessment            Diet: Sees a nutrtionist.      Exercise and Physical Activity Assessment: Occasional exercise      Sexual Assessment            Sexually active: Yes.  Sexual orientation: Heterosexual.        Physical Examination   Vital signs reviewed  and within acceptable limits    Vital Signs   2018 10:47 AM CDT Temperature Tympanic 100.9 DegF  HI    Peripheral Pulse Rate 87 bpm    Oxygen Saturation 97 %      Measurements from flowsheet : Measurements   2018 10:47 AM CDT Height  Measured - Standard 64.5 in    Weight Measured - Standard 143 lb    BSA 1.72 m2    Body Mass Index 24.16 kg/m2      General:  Moderate distress.    Eye:  Pupils are equal, round and reactive to light, Extraocular movements are intact, Normal conjunctiva.    HENT:  Normocephalic, Tympanic membranes are clear, Normal hearing, Oral mucosa is moist.         Nose: Both nostrils, Discharge ( Moderate amount, Clear ), congested.         Throat: Pharynx ( Posterior, Erythematous ).    Neck:  Supple, Non-tender, No lymphadenopathy.    Respiratory:  Lungs are clear to auscultation, Respirations are non-labored, Breath sounds are equal, demonstrates frequent loose cough.    Cardiovascular:  Normal rate, Regular rhythm, No murmur, Normal peripheral perfusion, No edema.    Integumentary:  Warm, Dry, Pink.    Psychiatric:  Cooperative, Appropriate mood & affect, Normal judgment.

## 2022-02-15 NOTE — PROGRESS NOTES
Patient:   MARTHA DOE            MRN: [id number]            FIN: [id number]               Age:   46 years     Sex:  Female     :  1970   Associated Diagnoses:   Crohn's Colitis   Author:   Ismael Ko MD      Impression and Plan   Diagnosis     Crohn's Colitis (OGQ97-RM K50.10).     Course:  Worsening.    Orders     Orders   Charges (Evaluation and Management):  08247 office outpatient visit 15 minutes (Charge) (Order): Quantity: 1, Crohn's Colitis.     Orders (Selected)   Outpatient Orders  Ordered  CBC, Platelet; no differential (Request): Crohn's Colitis  CRP (Request): Crohn's Colitis  Comprehensive Metabolic Panel (Request): Crohn's Colitis  Urine Pregnancy Test (Request): Amenorrhea  Prescriptions  Prescribed  predniSONE 10 mg oral tablet: See Instructions, Instructions: four tablets daily for 4 days, then three tablets daily for 4 days, then two tablets daily for 4 days, then one tablet daily for 4 days, # 40 tab(s), 0 Refill(s), Type: Maintenance, [facility] fou....        Visit Information      Date of Service: 05/15/2017 09:05 am  Performing Location: [facility]  Encounter#: [id number]      Primary Care Provider (PCP):  Ismael Ko MD    NPI# [id number]      Chief Complaint   5/15/2017 9:06 AM CDT    Pt here for chrohns/colitis flare up        History of Present Illness             The patient presents with diarrhea, due to Crohn's flare.  The diarrhea is described as liquid.  The severity of the diarrhea is mild.  The diarrhea is episodic and fluctuates in intensity.  The diarrhea has lasted for 4 week(s).  There are no modifying factors.  Associated symptoms consist of abdominal pain, mild weight loss, denies abdominal distention, denies blood in stool, denies chills, denies fever, denies dizziness, denies nausea, denies vomiting and denies weakness.        Review of Systems   Constitutional:  Negative.    Eye:  Negative.    Ear/Nose/Mouth/Throat:   Negative.    Respiratory:  Negative.    Cardiovascular:  Negative.    Gastrointestinal:  Negative except as documented in history of present illness.    Genitourinary:  Negative.    Hematology/Lymphatics:  Negative.    Endocrine:  Negative.    Immunologic:  Negative.    Musculoskeletal:  Negative.    Integumentary:  Negative.    Neurologic:  Negative.    Psychiatric:  Negative.    All other systems reviewed and negative      Health Status   Allergies:    Allergic Reactions (Selected)  Severity Not Documented  Augmentin (No reactions were documented)  Triple Antibiotic (No reactions were documented)  Triple antibiotic ointment (No reactions were documented)   Medications:  (Selected)   Prescriptions  Prescribed  predniSONE 10 mg oral tablet: See Instructions, Instructions: four tablets daily for 4 days, then three tablets daily for 4 days, then two tablets daily for 4 days, then one tablet daily for 4 days, # 40 tab(s), 0 Refill(s), Type: Maintenance, Pharmacy: [facility], fou...   Problem list:    All Problems (Selected)  Crohn's Colitis / ICD-9-.1 / Confirmed  Crohn's disease / ICD-9-.9 / Confirmed  Restless legs syndrome / SNOMED CT 10948401 / Confirmed  Twin gestation, dichorionic diamniotic / ICD-9-CM V91.03 / Confirmed  Twin pregnancy / SNOMED CT 964187776 / Confirmed   (vaginal birth after ) / SNOMED CT 338684692 / Confirmed      Histories   Past Medical History:    Active  Crohn's Colitis (555.1): Onset in  at 32 years.  Crohn's disease (555.9)  Resolved  ASD (Atrial Septal Defect) (745.5): Onset in  at 23 years.  Resolved.  Pregnancy (544895056):  Resolved on 1999 at 28 years.  Pregnancy (755546456):  Resolved on 3/23/2001 at 30 years.  Pregnancy (702166339):  Resolved on 10/12/2004 at 34 years.  Pregnancy (688204314):  Resolved on 3/18/2006 at 35 years.  Pregnancy (145740587):  Resolved on 2007 at 37 years.  Pregnancy (058304808):  Resolved on 2009  at 38 years.  Pregnancy (775615658):  Resolved on 7/15/2012 at 42 years.   Family History:    CHF - Congestive heart failure  Father (Daniel)  Pacemaker  Father (Daniel)  Polycystic kidney disease.  Mother (Enedelia)     Procedure history:    History of atrial septal defect repair (ICD-9-CM V15.1) in the month of 1994 at 24 Years.   section (SNOMED CT 51766903).   (vaginal birth after ) (SNOMED CT [id number]).  Comments:  2/3/2012 11:48 AM - Tanya Grande 3  Extraction of wisdom tooth (SNOMED CT 262949585).  Comments:  2012 4:24 PM - Damaris Damon CMA  All 4 removed      Physical Examination   Vital Signs   5/15/2017 9:06 AM CDT Temperature Tympanic 99.0 DegF    Peripheral Pulse Rate 76 bpm    Pulse Site Radial artery    HR Method Manual    Systolic Blood Pressure 104 mmHg    Diastolic Blood Pressure 62 mmHg    Mean Arterial Pressure 76 mmHg    BP Site Right arm    BP Method Manual      Measurements from flowsheet : Measurements   5/15/2017 9:06 AM CDT Height Measured - Standard 64.5 in    Weight Measured - Standard 136.8 lb    BSA 1.68 m2    Body Mass Index 23.12 kg/m2      General:  No acute distress.    Neck:  Supple, No lymphadenopathy, No thyromegaly.    Respiratory:  Lungs are clear to auscultation, Respirations are non-labored, Breath sounds are equal, Symmetrical chest wall expansion.    Cardiovascular:  Normal rate, Regular rhythm, No murmur, No gallop, Good pulses equal in all extremities, Normal peripheral perfusion, No edema.    Gastrointestinal:  Soft, Non-tender, Non-distended, Normal bowel sounds, No organomegaly, No gaurding or rebound or percussion tenderness.    Integumentary:  Warm, Dry, Pink.    Neurologic:  Alert, Oriented.    Psychiatric:  Cooperative, Appropriate mood & affect.       Review / Management   Results review:  UPT: NEG..

## 2022-02-15 NOTE — PROGRESS NOTES
Patient:   MARTHA DOE            MRN: [id number]            FIN: [id number]               Age:   46 years     Sex:  Female     :  1970   Associated Diagnoses:   None   Author:   Corwin Johnson MD      Visit Information      Date of Service: 2017 11:40 am  Performing Location: [facility]  Encounter#: [id number]      Primary Care Provider (PCP):  Ismael Ko MD    NPI# [id number]      Referring Provider:  No referring provider recorded for selected visit.      Chief Complaint   2017 11:50 AM CST   Pt c/o rash on both hands from dry skin.        History of Present Illness   She has rash on her hands that is worse in winter. Responded to mometasone which she had prescribed last time but she ran out of this. Washes her hands a lot.       Review of Systems         Skin - no rash elsewhere      Health Status   Allergies:    Allergic Reactions (Selected)  Severity Not Documented  Augmentin (No reactions were documented)  Triple Antibiotic (No reactions were documented)  Triple antibiotic ointment (No reactions were documented)   Medications:  (Selected)   Prescriptions  Prescribed  Elocon 0.1% topical cream: 1 leslie, TOP, Daily, # 30 g, 0 Refill(s), Type: Maintenance, Pharmacy: [facility] #[id number], 1 leslie top daily   Problem list:    All Problems (Selected)  Crohn's Colitis / ICD-9-.1 / Confirmed  Crohn's disease / ICD-9-.9 / Confirmed  Restless legs syndrome / SNOMED CT 12306444 / Confirmed  Twin gestation, dichorionic diamniotic / ICD-9-CM V91.03 / Confirmed  Twin pregnancy / SNOMED CT 585810542 / Confirmed   (vaginal birth after ) / SNOMED CT 474962788 / Confirmed      Histories   Past Medical History:    Active  Crohn's Colitis (555.1): Onset in  at 32 years.  Crohn's disease (555.9)  Resolved  ASD (Atrial Septal Defect) (745.5): Onset in  at 23 years.  Resolved.  Pregnancy (149385314):  Resolved on 1999 at 28 years.  Pregnancy (038779746):   Resolved on 3/23/2001 at 30 years.  Pregnancy (080628716):  Resolved on 10/12/2004 at 34 years.  Pregnancy (167530658):  Resolved on 3/18/2006 at 35 years.  Pregnancy (222175234):  Resolved on 2007 at 37 years.  Pregnancy (582645331):  Resolved on 2009 at 38 years.  Pregnancy (868658443):  Resolved on 7/15/2012 at 42 years.   Family History:    CHF - Congestive heart failure  Father (Daniel)  Pacemaker  Father (Daniel)  Polycystic kidney disease.  Mother (nEedelia)     Procedure history:    History of atrial septal defect repair (V15.1) in the month of 1994 at 24 Years.   section (38336311).   (vaginal birth after ) ([id number]).  Comments:  2/3/2012 11:48 AM - Tanya Grande 3  Extraction of wisdom tooth (444908326).  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      Home and Environment Assessment            Risks in environment: Does not wear helmet.            Marital status: .  Spouse/Partner name: Alexander Connors.  Lives with Children, Spouse.  6 children.               Living situation: Home/Independent.      Nutrition and Health Assessment            Diet: Sees a nutrtionist.      Exercise and Physical Activity Assessment: Occasional exercise      Sexual Assessment            Sexually active: Yes.  Sexual orientation: Heterosexual.        Physical Examination   Vital Signs   2017 11:50 AM CST Temperature Tympanic 97.5 DegF  LOW    Peripheral Pulse Rate 59 bpm  LOW    Systolic Blood Pressure 115 mmHg    Diastolic Blood Pressure 74 mmHg    Mean Arterial Pressure 88 mmHg      - xerosis, inflammation of skin on  palms and dorsum of her hands      Impression and Plan   Dishydrotic eczema  - recomended frequent moisturizing. Refilled mometasone, discussed how to use. If not improving with this could use clobetasol instead.

## 2022-03-05 ENCOUNTER — HEALTH MAINTENANCE LETTER (OUTPATIENT)
Age: 52
End: 2022-03-05

## 2022-03-28 ENCOUNTER — TRANSFERRED RECORDS (OUTPATIENT)
Dept: HEALTH INFORMATION MANAGEMENT | Facility: CLINIC | Age: 52
End: 2022-03-28

## 2022-11-19 ENCOUNTER — HEALTH MAINTENANCE LETTER (OUTPATIENT)
Age: 52
End: 2022-11-19

## 2023-02-09 ENCOUNTER — TELEPHONE (OUTPATIENT)
Dept: PEDIATRIC CARDIOLOGY | Facility: CLINIC | Age: 53
End: 2023-02-09
Payer: COMMERCIAL

## 2023-02-09 DIAGNOSIS — Q24.9 ADULT CONGENITAL HEART DISEASE: ICD-10-CM

## 2023-02-09 DIAGNOSIS — I34.1 MITRAL VALVE PROLAPSE: ICD-10-CM

## 2023-02-09 DIAGNOSIS — Q21.10 ASD (ATRIAL SEPTAL DEFECT): Primary | ICD-10-CM

## 2023-02-09 NOTE — TELEPHONE ENCOUNTER
Date: 2/9/2023    Time of Call: 4:12 PM     Diagnosis:  ASD     [ TORB ] Ordering provider: Oleksandr Holder MD  Order: echo and labs prior     Order received by: Kristel Block RN      Follow-up/additional notes: sent to scheduling

## 2023-02-09 NOTE — TELEPHONE ENCOUNTER
Pt calling in regards to refferal by Maximo Melgar, to schedule appt with cardiology with echo prior to seeing Dr. Holder

## 2023-02-10 NOTE — TELEPHONE ENCOUNTER
Pt states that she had a biotel monitor April of last year through [facility]. She is wondering if we can review and let her know if she needs another one.

## 2023-02-22 NOTE — TELEPHONE ENCOUNTER
ACHD/CV GENETICS Dx: ASD   AMIRA STATUS N/A   OP REPORTS/CATHS Scanned under media - Atrial septal defect status post surgical patch repair in 1993 at 23 years of age.   GENETIC TESTING Saw Norma Woods in 2014   ADDITIONAL RECORDS Previously saw Dr. Barnard 2018   NOTES FOR CLINIC SPECIALIST Please get reocrds from [facility]    04/2022- Echo, ACT monitor  03/2022 EKG  08/2021 CT abdomen  12/2020 CXRAY

## 2023-04-15 ENCOUNTER — HEALTH MAINTENANCE LETTER (OUTPATIENT)
Age: 53
End: 2023-04-15

## 2023-05-06 NOTE — TELEPHONE ENCOUNTER
RECORDS RECEIVED FROM: Onelia Ramirez on 5/6/2023     DATE RECEIVED:    GENERAL RECORDS STATUS DETAILS   OFFICE NOTE from cardiologists Care Everywhere 5/10/22 [place]   LABS Care Everywhere Internal    EKG (STRIPS & REPORTS) Received 3/28/22   MONITORS (STRIPS & REPORTS) Care Everywhere 4/5/22 [place]   ECHOS (IMAGES AND REPORTS) Received 4/27/22   CONGENITAL AND GENETICS     GENETICS TESTING   (ALL FROM BIRTH - PRESENT) Internal 4/8/14   OPERATIVE REPORTS  (ALL FROM BIRTH - PRESENT) Received    CT/CTA (ALL IMAGES AND REPORTS FROM BIRTH - PRESENT) Received 8/30/21   CHEST XRAY Received 12/17/20     Chantal Ramirez on 5/6/2023     Action Taken Imaging requested from [facility].     Chantal Ramirez on 5/10/2023     Action Taken [facility] received and resolved.  CT and EKG requested from [facility].  Tracking # [id number]     Chantal Ramirez on 5/17/2023     Action Taken Follow Up:  Imaging being PUSHED from [facility].  EKG being faxed.     Chantal Ramirez on 5/19/2023     Action Taken CT received and resolved  EKG sent to scan

## 2023-05-16 ASSESSMENT — ENCOUNTER SYMPTOMS
HYPERTENSION: 0
SKIN CHANGES: 0
ARTHRALGIAS: 0
LEG PAIN: 0
BACK PAIN: 1
NECK PAIN: 1
SLEEP DISTURBANCES DUE TO BREATHING: 0
PALPITATIONS: 1
NAIL CHANGES: 0
LIGHT-HEADEDNESS: 0
MUSCLE CRAMPS: 0
ORTHOPNEA: 0
MYALGIAS: 1
HYPOTENSION: 0
SYNCOPE: 0
POOR WOUND HEALING: 0
STIFFNESS: 0
JOINT SWELLING: 0
EXERCISE INTOLERANCE: 0
MUSCLE WEAKNESS: 0

## 2023-05-23 ENCOUNTER — LAB (OUTPATIENT)
Dept: LAB | Facility: CLINIC | Age: 53
End: 2023-05-23
Payer: COMMERCIAL

## 2023-05-23 ENCOUNTER — PRE VISIT (OUTPATIENT)
Dept: PEDIATRIC CARDIOLOGY | Facility: CLINIC | Age: 53
End: 2023-05-23

## 2023-05-23 ENCOUNTER — OFFICE VISIT (OUTPATIENT)
Dept: CARDIOLOGY | Facility: CLINIC | Age: 53
End: 2023-05-23
Payer: COMMERCIAL

## 2023-05-23 ENCOUNTER — HOSPITAL ENCOUNTER (OUTPATIENT)
Dept: CARDIOLOGY | Facility: CLINIC | Age: 53
Discharge: HOME | End: 2023-05-23
Payer: COMMERCIAL

## 2023-05-23 ENCOUNTER — ANCILLARY PROCEDURE (OUTPATIENT)
Dept: CARDIOLOGY | Facility: CLINIC | Age: 53
End: 2023-05-23
Payer: COMMERCIAL

## 2023-05-23 VITALS
HEIGHT: 65 IN | OXYGEN SATURATION: 97 % | BODY MASS INDEX: 21.01 KG/M2 | SYSTOLIC BLOOD PRESSURE: 105 MMHG | WEIGHT: 126.1 LBS | HEART RATE: 65 BPM | DIASTOLIC BLOOD PRESSURE: 61 MMHG

## 2023-05-23 DIAGNOSIS — I34.1 MITRAL VALVE PROLAPSE: ICD-10-CM

## 2023-05-23 DIAGNOSIS — Q24.9 ADULT CONGENITAL HEART DISEASE: ICD-10-CM

## 2023-05-23 DIAGNOSIS — Q21.10 ASD (ATRIAL SEPTAL DEFECT): ICD-10-CM

## 2023-05-23 LAB
ALBUMIN SERPL BCG-MCNC: 4.1 G/DL (ref 3.5–5.2)
ALP SERPL-CCNC: 75 U/L (ref 35–104)
ALT SERPL W P-5'-P-CCNC: 45 U/L (ref 10–35)
ANION GAP SERPL CALCULATED.3IONS-SCNC: 8 MMOL/L (ref 7–15)
AST SERPL W P-5'-P-CCNC: 44 U/L (ref 10–35)
BASOPHILS # BLD AUTO: 0.1 10E3/UL (ref 0–0.2)
BASOPHILS NFR BLD AUTO: 1 %
BILIRUB SERPL-MCNC: 0.7 MG/DL
BUN SERPL-MCNC: 18 MG/DL (ref 6–20)
CALCIUM SERPL-MCNC: 9.4 MG/DL (ref 8.6–10)
CHLORIDE SERPL-SCNC: 105 MMOL/L (ref 98–107)
CHOLEST SERPL-MCNC: 119 MG/DL
CREAT SERPL-MCNC: 0.83 MG/DL (ref 0.51–0.95)
DEPRECATED HCO3 PLAS-SCNC: 28 MMOL/L (ref 22–29)
EOSINOPHIL # BLD AUTO: 0.1 10E3/UL (ref 0–0.7)
EOSINOPHIL NFR BLD AUTO: 3 %
ERYTHROCYTE [DISTWIDTH] IN BLOOD BY AUTOMATED COUNT: 13.2 % (ref 10–15)
GFR SERPL CREATININE-BSD FRML MDRD: 85 ML/MIN/1.73M2
GLUCOSE SERPL-MCNC: 102 MG/DL (ref 70–99)
HCT VFR BLD AUTO: 40.2 % (ref 35–47)
HDLC SERPL-MCNC: 57 MG/DL
HGB BLD-MCNC: 13.2 G/DL (ref 11.7–15.7)
IMM GRANULOCYTES # BLD: 0 10E3/UL
IMM GRANULOCYTES NFR BLD: 0 %
LDLC SERPL CALC-MCNC: 55 MG/DL
LYMPHOCYTES # BLD AUTO: 1.7 10E3/UL (ref 0.8–5.3)
LYMPHOCYTES NFR BLD AUTO: 36 %
MCH RBC QN AUTO: 30.9 PG (ref 26.5–33)
MCHC RBC AUTO-ENTMCNC: 32.8 G/DL (ref 31.5–36.5)
MCV RBC AUTO: 94 FL (ref 78–100)
MONOCYTES # BLD AUTO: 0.5 10E3/UL (ref 0–1.3)
MONOCYTES NFR BLD AUTO: 10 %
NEUTROPHILS # BLD AUTO: 2.3 10E3/UL (ref 1.6–8.3)
NEUTROPHILS NFR BLD AUTO: 50 %
NONHDLC SERPL-MCNC: 62 MG/DL
NRBC # BLD AUTO: 0 10E3/UL
NRBC BLD AUTO-RTO: 0 /100
PLATELET # BLD AUTO: 147 10E3/UL (ref 150–450)
POTASSIUM SERPL-SCNC: 3.8 MMOL/L (ref 3.4–5.3)
PROT SERPL-MCNC: 6.6 G/DL (ref 6.4–8.3)
RBC # BLD AUTO: 4.27 10E6/UL (ref 3.8–5.2)
SODIUM SERPL-SCNC: 141 MMOL/L (ref 136–145)
TRIGL SERPL-MCNC: 36 MG/DL
TSH SERPL DL<=0.005 MIU/L-ACNC: 1.62 UIU/ML (ref 0.3–4.2)
WBC # BLD AUTO: 4.6 10E3/UL (ref 4–11)

## 2023-05-23 PROCEDURE — G0463 HOSPITAL OUTPT CLINIC VISIT: HCPCS | Mod: 25

## 2023-05-23 PROCEDURE — 99204 OFFICE O/P NEW MOD 45 MIN: CPT | Mod: 25

## 2023-05-23 PROCEDURE — 93325 DOPPLER ECHO COLOR FLOW MAPG: CPT | Mod: 26 | Performed by: PEDIATRICS

## 2023-05-23 PROCEDURE — 93005 ELECTROCARDIOGRAM TRACING: CPT | Mod: 59

## 2023-05-23 PROCEDURE — 93010 ELECTROCARDIOGRAM REPORT: CPT | Mod: 59 | Performed by: INTERNAL MEDICINE

## 2023-05-23 PROCEDURE — 93320 DOPPLER ECHO COMPLETE: CPT | Mod: 26 | Performed by: PEDIATRICS

## 2023-05-23 PROCEDURE — 80061 LIPID PANEL: CPT | Performed by: PATHOLOGY

## 2023-05-23 PROCEDURE — 93303 ECHO TRANSTHORACIC: CPT | Mod: 26 | Performed by: PEDIATRICS

## 2023-05-23 PROCEDURE — 93242 EXT ECG>48HR<7D RECORDING: CPT

## 2023-05-23 PROCEDURE — 93325 DOPPLER ECHO COLOR FLOW MAPG: CPT

## 2023-05-23 PROCEDURE — 80050 GENERAL HEALTH PANEL: CPT | Performed by: PATHOLOGY

## 2023-05-23 PROCEDURE — 36415 COLL VENOUS BLD VENIPUNCTURE: CPT | Performed by: PATHOLOGY

## 2023-05-23 PROCEDURE — 93248 EXT ECG>7D<15D REV&INTERPJ: CPT | Performed by: INTERNAL MEDICINE

## 2023-05-23 RX ORDER — TRIAMCINOLONE ACETONIDE 1 MG/G
OINTMENT TOPICAL
COMMUNITY
Start: 2023-03-31

## 2023-05-23 ASSESSMENT — PAIN SCALES - GENERAL: PAINLEVEL: NO PAIN (0)

## 2023-05-23 NOTE — Clinical Note
5/23/2023      RE: Lucrecia GAXIOLA Dory  [address]   [address]  [place] [address]       Dear Colleague,    Thank you for the opportunity to participate in the care of your patient, Lucrecia Connors, at the [facility] at [facility]. Please see a copy of my visit note below.    No notes on file    Please do not hesitate to contact me if you have any questions/concerns.     Sincerely,     Oleksandr Holder MD

## 2023-05-23 NOTE — PROGRESS NOTES
Per Dr. Holder, patient to have Zio monitor placed.  Diagnosis: ASD (Q21.10)  Monitor placed: Yes  Patient Instructed: Yes  Patient verbalized understanding: Yes  Holter #[id number]    Monitor was placed by CARLY Whitlock   2:54 PM

## 2023-05-23 NOTE — NURSING NOTE
Chief Complaint   Patient presents with     New Patient     52 year old female with history of ASD s/p patch repair in 1993 presenting for evaluation.       Vitals were taken, medications reconciled and EKG performed.    Mali Mckeon, EMT   1:16 PM

## 2023-05-23 NOTE — NURSING NOTE
Cardiac Monitors: Patient was instructed regarding the indication, function, care and prompt return of a holter  monitor. The monitor was placed on the patient with instructions regarding care of the skin electrodes and monitor, as well as documentation in the patient diary.    Patient demonstrated understanding of this information and agreed to call with further questions or concerns.  Cardiac Testing: Patient given instructions regarding  echocardiogram . Discussed purpose, preparation, procedure and when to expect results reported back to the patient. Patient demonstrated understanding of this information and agreed to call with further questions or concerns.    Med Reconcile: Reviewed and verified all current medications with the patient. The updated medication list was printed and given to the patient.    Return Appointment: Patient given instructions regarding scheduling next clinic visit. Patient demonstrated understanding of this information and agreed to call with further questions or concerns.    Patient stated she understood all health information given and agreed to call with further questions or concerns.

## 2023-05-23 NOTE — PATIENT INSTRUCTIONS
You were seen today in the Adult Congenital and Cardiovascular Genetics Clinic at the [facility].    Cardiology Providers you saw during your visit:  Oleksandr Holder MD    Diagnosis:  History of ASD    Results:  Oleksandr Holder MD reviewed the results of your EKG, echo and lab testing today in clinic.    Recommendations for you:    Place 7 day zio patch today, we will call you with results      General Cardiac Recommendations:  Continue to eat a heart healthy, low salt diet.  Continue to get 20-30 minutes of aerobic activity, 4-5 days per week.  Examples of aerobic activity include walking, running, swimming, cycling, etc.  Continue to observe good oral hygiene, with regular dental visits.      SBE prophylaxis:   Yes____  No___x_      Exercise restrictions:   Yes__X__  No____         If yes, list restrictions:  Must be allowed to rest if fatigued or SOB      FASTING CHOLESTEROL was checked in the last 5 years YES__x_  NO___ (2023)  If no, please follow up with your primary care physician. You should have a cholesterol screening every 5 years.      Follow-up:  Follow up with Dr Holder in 1 year with an echo prior    If you have questions or concerns please contact us at:    Kristel Block, RN, BSN   Magaly Johnson (Scheduling)  Nurse Care Coordinator     Clinic   Adult Congenital and CV Genetics   Adult Congenital and CV Genetic  [facility]   [facility]  (P) [phone]     (P) [phone]            (F) [phone]        For after hours urgent needs, call [phone] and ask to speak to the Adult Congenital Physician on call.  Mention Job Code 0401.    For emergencies call 911.    [facility]  [facility]   Clinics and Surgery Center  Mail Code [id number]  [address] [place] [address] [address]

## 2023-05-23 NOTE — PROGRESS NOTES
Adult Congenital Cardiology Clinic  Adult Congenital Heart Disease Clinic Visit    HPI:    Jeremy Connors is a 52-year-old female who is s/p surgical patch repair of an atrial septal defect in 1993 at 23 years of age at [facility]. She was initially diagnosed after auscultation of heart murmur prompted an echocardiogram which revealed her atrial septal defect.  Her surgical course was uncomplicated. She was last seen in [facility] clinic 5 years ago and was doing well at that time. She is here today for follow up.    Jeremy notes that she saw a cardiologist in [place] last year due to palpitations and racing heart beat. She wore a 2 week monitor and by her report she had 2 minutes of atrial fibrillation in the 2 week period. She does feel occasional palpitations. THey ae worse with stress and fatigue. She was told to take potassium and magnesium. She reports that she is still feeling fast heart rates that come and go. Jeremy exercises regularly doing walking and HIT. She has been ill with gastroenteritis for about the last month and has not been exercising regularly.     Jeremy has Crohns disease Crohn's colitis which manages with diet. She has had loose stools for one month. She has had racing heart beat with colonoscopy in the past.   She also has intermittent raised plaques on her skin that come and go. SHe has not seen an allergist or immunologist.     Jeremy has 9 children, 4 of whom have atrial septal defects. Per her prior [facility] cardiologist, There has been a discussion with a genetic counselor in the past regarding genetic testing and it has been deferred to date. Three children have had device closure, one has not had intervention.      Jeremy denies any chest pain, shortness of breath, dyspnea on exertion, paroxysmal nocturnal dyspnea, orthopnea, or lower extremity edema, no syncope.  She does have palpitations and intermittent racing heart beat, She does not want to try beta blocker  "therapy.     PAST MEDICAL HISTORY:  Past Medical History:   Diagnosis Date     ASD (atrial septal defect)     s/p patch repair in 1993     Contact dermatitis and other eczema, due to unspecified cause    - Chron's Disease    Family History   Problem Relation Age of Onset     Heart Disease Son         ASD     Heart Disease Daughter         ASD     Heart Disease Son         ASD     CURRENT MEDICATIONS:  triamcinolone (KENALOG) 0.1 % external ointment, APPLY OINTMENT TOPICALLY TO AFFECTED AREA TWICE DAILY    No current facility-administered medications on file prior to visit.    NKDA       EXAM:  Vitals: /61 (BP Location: Right arm, Patient Position: Sitting, Cuff Size: Adult Regular)   Pulse 65   Ht 1.644 m (5' 4.72\")   Wt 57.2 kg (126 lb 1.6 oz)   SpO2 97%   BMI 21.16 kg/m    General: appears comfortable, alert  Head: normocephalic, atraumatic  Eyes: anicteric sclera, EOMI  Neck: no adenopathy or masses, 2+ carotids without bruits  Orophyarynx: moist mucosa, no lesions, dentition intact  Heart: regular, S1/S2, short 2/6 MP, no DM gallop, or rub,   Lungs: clear, no rales or wheezing  Abdomen: soft, non-tender, bowel sounds present, no hepatomegaly  Extremities: no clubbing, cyanosis or edema  Neurological: normal speech and affect, no gross motor deficits    MRI (3/30/18):  Preliminary read: Normal left ventricular systolic function, normal right ventricular systolic function, right ventricular enlargement is present.  Mitral valve prolapse is present without significant regurgitation.    EKG 5- NSR with mild IVCD in RBBB pattern  LAbs show mild elevation of LFT's , plt count slightly low, Normal hemoglobin and red cell indices.   Results for orders placed or performed in visit on 05/23/23   Comprehensive metabolic panel     Status: Abnormal   Result Value Ref Range    Sodium 141 136 - 145 mmol/L    Potassium 3.8 3.4 - 5.3 mmol/L    Chloride 105 98 - 107 mmol/L    Carbon Dioxide (CO2) 28 22 - 29 " mmol/L    Anion Gap 8 7 - 15 mmol/L    Urea Nitrogen 18.0 6.0 - 20.0 mg/dL    Creatinine 0.83 0.51 - 0.95 mg/dL    Calcium 9.4 8.6 - 10.0 mg/dL    Glucose 102 (H) 70 - 99 mg/dL    Alkaline Phosphatase 75 35 - 104 U/L    AST 44 (H) 10 - 35 U/L    ALT 45 (H) 10 - 35 U/L    Protein Total 6.6 6.4 - 8.3 g/dL    Albumin 4.1 3.5 - 5.2 g/dL    Bilirubin Total 0.7 <=1.2 mg/dL    GFR Estimate 85 >60 mL/min/1.73m2   Lipid panel reflex to direct LDL Fasting     Status: Normal   Result Value Ref Range    Cholesterol 119 <200 mg/dL    Triglycerides 36 <150 mg/dL    Direct Measure HDL 57 >=50 mg/dL    LDL Cholesterol Calculated 55 <=100 mg/dL    Non HDL Cholesterol 62 <130 mg/dL    Narrative    Cholesterol  Desirable:  <200 mg/dL    Triglycerides  Normal:  Less than 150 mg/dL  Borderline High:  150-199 mg/dL  High:  200-499 mg/dL  Very High:  Greater than or equal to 500 mg/dL    Direct Measure HDL  Female:  Greater than or equal to 50 mg/dL   Male:  Greater than or equal to 40 mg/dL    LDL Cholesterol  Desirable:  <100mg/dL  Above Desirable:  100-129 mg/dL   Borderline High:  130-159 mg/dL   High:  160-189 mg/dL   Very High:  >= 190 mg/dL    Non HDL Cholesterol  Desirable:  130 mg/dL  Above Desirable:  130-159 mg/dL  Borderline High:  160-189 mg/dL  High:  190-219 mg/dL  Very High:  Greater than or equal to 220 mg/dL   TSH with free T4 reflex     Status: Normal   Result Value Ref Range    TSH 1.62 0.30 - 4.20 uIU/mL   CBC with platelets and differential     Status: Abnormal   Result Value Ref Range    WBC Count 4.6 4.0 - 11.0 10e3/uL    RBC Count 4.27 3.80 - 5.20 10e6/uL    Hemoglobin 13.2 11.7 - 15.7 g/dL    Hematocrit 40.2 35.0 - 47.0 %    MCV 94 78 - 100 fL    MCH 30.9 26.5 - 33.0 pg    MCHC 32.8 31.5 - 36.5 g/dL    RDW 13.2 10.0 - 15.0 %    Platelet Count 147 (L) 150 - 450 10e3/uL    % Neutrophils 50 %    % Lymphocytes 36 %    % Monocytes 10 %    % Eosinophils 3 %    % Basophils 1 %    % Immature Granulocytes 0 %    NRBCs per  100 WBC 0 <1 /100    Absolute Neutrophils 2.3 1.6 - 8.3 10e3/uL    Absolute Lymphocytes 1.7 0.8 - 5.3 10e3/uL    Absolute Monocytes 0.5 0.0 - 1.3 10e3/uL    Absolute Eosinophils 0.1 0.0 - 0.7 10e3/uL    Absolute Basophils 0.1 0.0 - 0.2 10e3/uL    Absolute Immature Granulocytes 0.0 <=0.4 10e3/uL    Absolute NRBCs 0.0 10e3/uL   CBC with Platelets & Differential     Status: Abnormal    Narrative    The following orders were created for panel order CBC with Platelets & Differential.  Procedure                               Abnormality         Status                     ---------                               -----------         ------                     CBC with platelets and d...[[id number]]  Abnormal            Final result                 Please view results for these tests on the individual orders.   Results for orders placed or performed during the hospital encounter of 23   Echo Pediatric Congenital (TTE)     Status: None    Narrative    [id number]  [id number]  [id number]  [id number]^DARIEN^DARLINE                                                               Study ID: [id number]                                                 [place] [address]                                                Phone: [phone]                                Pediatric Echocardiogram  ______________________________________________________________________________  Name: MARTHA DOE  Study Date: 2023 08:44 AM                     Patient Location: [facility]  MRN: [id number]                                     Age: 52 yrs  : 1970                                     BP: 127/91 mmHg  Gender: Female                                      HR: 54  Patient Class: Outpatient                           Height: 64  in  Ordering Provider: RAMY LEDEZMA BRENDON                Weight: 120 lb  Referring Provider: DARIEN RAMY OLIVAS               BSA: 1.6 m2  Performed By: Enedelia Harrington  Report approved by: Reginald James MD  Reason For Study: ASD (atrial septal defect), Adult congenital heart disease,  Mitr  ______________________________________________________________________________  ##### CONCLUSIONS #####  Post device closure of secundum atrial septal defect.     No residual atrial level shunt. No obstruction to systemic venous return. Mild  mitral valve insufficiency. Upper mild tricuspid valve insufficiency, the  estimated RV pressures is 22 mmHg above right atrial pressure. There is normal  appearance and motion of the pulmonary and aortic valves. The left and right  ventricles have normal chamber size, wall thickness, and systolic function.  The calculated biplane left ventricular ejection fraction is 59%. No  pericardial effusion.  ______________________________________________________________________________  Technical information:  A complete two dimensional, MMODE, spectral and color Doppler transthoracic  echocardiogram is performed. Images are obtained from parasternal, apical,  subcostal and suprasternal notch views. The study quality is fair. Prior  echocardiogram available for comparison. ECG tracing shows sinus bradycardia  at 54 bpm. Patient heart was beating fast, having abnormal beats whenever she  laid down for echo but once she relaxed her heart rate became sinus  bradycardia.     Segmental Anatomy:  There is normal atrial arrangement, with concordant atrioventricular and  ventriculoarterial connections.     Systemic and pulmonary veins:  The systemic venous return is normal. Normal coronary sinus. Color flow  demonstrates flow from at least one pulmonary vein entering the left atrium.     Atria and atrial septum:  Normal right atrial size. The left atrium is normal in size. Post device  closure of secundum atrial  septal defect. No residual atrial level shunt.     Atrioventricular valves:  The tricuspid valve is normal in appearance and motion. Upper mild (1-2+)  tricuspid valve insufficiency. Estimated right ventricular systolic pressure  is 22 mmHg plus right atrial pressure. The mitral valve is normal in  appearance and motion. Mild (1+) mitral valve insufficiency.     Ventricles and Ventricular Septum:  The left and right ventricles have normal chamber size, wall thickness, and  systolic function. The calculated biplane left ventricular ejection fraction  is 59 %. There is no ventricular level shunting.     Outflow tracts:  Normal great artery relationship. There is unobstructed flow through the right  ventricular outflow tract. The pulmonary valve motion is normal. There is  normal flow across the pulmonary valve. Trivial pulmonary valve insufficiency.  There is unobstructed flow through the left ventricular outflow tract.  Tricuspid aortic valve with normal appearance and motion. There is normal flow  across the aortic valve.     Great arteries:  The main pulmonary artery has normal appearance. There is unobstructed flow in  the main pulmonary artery. The pulmonary artery bifurcation is normal. There  is unobstructed flow in both branch pulmonary arteries. Normal ascending  aorta. The aortic arch appears normal. There is unobstructed antegrade flow in  the ascending, transverse arch, descending thoracic and abdominal aorta.     Arterial Shunts:  No obvious arterial level shunting.     Coronaries:  The coronary arteries are not well visualized.     Effusions, catheters, cannulas and leads:  No pericardial effusion.     MMode/2D Measurements & Calculations  LA dimension: 3.0 cm                       Ao root diam: 2.1 cm  LA/Ao: 1.4                                 2 Chamber EF: 59.0 %  4 Chamber EF: 60.0 %                       EF Biplane: 59.0 %  LVMI(BSA): 77.5 grams/m2                   LVMI(Height): 32.7     RWT(MM):  0.28     Doppler Measurements & Calculations  MV E max clive: 60.2 cm/sec              Ao V2 max: 74.5 cm/sec  MV A max clive: 47.9 cm/sec              Ao max P.2 mmHg  MV E/A: 1.3  LV V1 max: 58.0 cm/sec                 TV E max clive: 55.8 cm/sec  LV V1 max P.3 mmHg                 TV A max clive: 43.9 cm/sec  PA V2 max: 77.0 cm/sec                 RV V1 max: 57.3 cm/sec  PA max P.4 mmHg                    RV V1 max P.3 mmHg  TR max clive: 237.0 cm/sec               LPA max clive: 64.2 cm/sec  TR max P.5 mmHg                   LPA max P.6 mmHg                                         RPA max clive: 78.5 cm/sec                                         RPA max P.5 mmHg  Lateral E/e': 4.8                      Medial E/e': 6.8     asc Ao max clive: 55.8 cm/sec            desc Ao max clive: 98.2 cm/sec  asc Ao max P.2 mmHg                desc Ao max PG: 3.9 mmHg  Lat Peak E' Clive: 12.5 cm/sec           Med Peak E' Clive: 8.9 cm/sec  MPA max clive: 78.5 cm/sec  MPA max P.5 mmHg     [place] 2D Z-SCORE VALUES  Measurement Name Value Z-ScorePredictedNormal Range  Ao sinus diam(2D)2.9 cm  Ao ST Jx Diam(2D)2.6 cm  AoV bing diam(2D)2.1 cm  asc Aorta(2D)    2.6 cm  LVLd apical(4ch) 7.2 cm  LVLs apical(4ch) 6.0 cm     [place] Z-Scores (Measurements & Calculations)  Measurement NameValue      Z-ScorePredictedNormal Range  IVSd(MM)        0.72 cm    -1.3   0.89     0.63 - 1.16  IVSs(MM)        1.2 cm     -0.10  1.2      0.91 - 1.56  LVIDd(MM)       5.1 cm     0.99   4.7      4.1 - 5.4  LVIDs(MM)       3.3 cm     0.84   3.1      2.4 - 3.7  LVPWd(MM)       0.72 cm    -1.1   0.84     0.62 - 1.06  LVPWs(MM)       1.4 cm     -0.27  1.4      1.1 - 1.7  LV mass(C)d(MM) 121.4 grams  FS(MM)          34.5 %     0.08   34.2     26.5 - 44.1     Report approved by: Estrella Dick 2023 10:00 AM           Assessment and Plan:    Mrs. Connors is a 52-year-old female with past medical history of a atrial septal defect status  post surgical repair. She has been bothered over the last year by racing heart rate and palpitations  SHe does have mild mitral valve regurgitation which should also be followed.     There are no signs of pulmonary hypertension on most recent echocardiogram. She has no overt signs of CHF. She does have significant GI invovlement with Crohns disease including one month of chronic diarrhea and abdominal discomfort. Concern for poor nutrient absorption and systemic inflammatory disease.    Mrs. Connors has declined genetics testing in the past. Given the high prevalence in her offspring 4/9 it is likely that there is a significant genetic component. Testing is always available to Lucrecia or her children.      Recommendations:    One week zio patch Holter  See primary or GI to discuss ongoing diarrhea and nutritional needs and discuss repeat elevated LFT's   Follow up one year with repeat echo and ECG or if new concerns or symptoms.     Oleksandr Holder M.D.   of Pediatrics  Pediatric and Adult Congenital Cardiology  [facility]  Pediatric Cardiology Office [phone]  Adult Congenital Cardiology Triage and Scheduling [phone]      A total of 45 minutes were spent in personal review of testing, including echo ecg and labs, review of documented history and interval events in chart, additional history from spouse, face to face visit with discussion of findings and plan, and care coordination.     Addendum 6/25/23: zio patch holter shows multiple short episodes of atrial tachycardia and PAC's and PVC's that are symptomatic.  Lucrecia would like to discuss with Dr. Garcia before initiating any therapy. I will contact her and discuss options and see if we can get her scheduled in for a virtual visit sooner.              Answers for HPI/ROS submitted by the patient on 5/16/2023  General Symptoms: No  Skin Symptoms: Yes  LOS  Symptoms: No  EYE SYMPTOMS: No  HEART SYMPTOMS: Yes  LUNG SYMPTOMS: No  INTESTINAL SYMPTOMS: No  URINARY SYMPTOMS: No  GYNECOLOGIC SYMPTOMS: No  BREAST SYMPTOMS: No  SKELETAL SYMPTOMS: Yes  BLOOD SYMPTOMS: No  NERVOUS SYSTEM SYMPTOMS: No  MENTAL HEALTH SYMPTOMS: No  Changes in hair: No  Changes in moles/birth marks: No  Itching: Yes  Rashes: Yes  Changes in nails: No  Acne: No  Hair in places you don't want it: No  Change in facial hair: No  Warts: No  Non-healing sores: No  Scarring: No  Flaking of skin: Yes  Color changes of hands/feet in cold : No  Sun sensitivity: No  Skin thickening: No  Chest pain or pressure: No  Fast or irregular heartbeat: Yes  Pain in legs with walking: No  Trouble breathing while lying down: No  Fingers or toes appear blue: No  High blood pressure: No  Low blood pressure: No  Fainting: No  Murmurs: No  Pacemaker: No  Varicose veins: No  Edema or swelling: No  Wake up at night with shortness of breath: No  Light-headedness: No  Exercise intolerance: No  Back pain: Yes  Muscle aches: Yes  Neck pain: Yes  Swollen joints: No  Joint pain: No  Muscle cramps: No  Muscle weakness: No  Joint stiffness: No  Bone fracture: No

## 2023-05-24 LAB
ATRIAL RATE - MUSE: 63 BPM
DIASTOLIC BLOOD PRESSURE - MUSE: NORMAL MMHG
INTERPRETATION ECG - MUSE: NORMAL
P AXIS - MUSE: -10 DEGREES
PR INTERVAL - MUSE: 176 MS
QRS DURATION - MUSE: 106 MS
QT - MUSE: 430 MS
QTC - MUSE: 440 MS
R AXIS - MUSE: 23 DEGREES
SYSTOLIC BLOOD PRESSURE - MUSE: NORMAL MMHG
T AXIS - MUSE: 50 DEGREES
VENTRICULAR RATE- MUSE: 63 BPM

## 2023-06-13 ENCOUNTER — MYC MEDICAL ADVICE (OUTPATIENT)
Dept: CARDIOLOGY | Facility: CLINIC | Age: 53
End: 2023-06-13
Payer: COMMERCIAL

## 2023-07-10 ENCOUNTER — MYC MEDICAL ADVICE (OUTPATIENT)
Dept: CARDIOLOGY | Facility: CLINIC | Age: 53
End: 2023-07-10
Payer: COMMERCIAL

## 2023-09-10 ENCOUNTER — HEALTH MAINTENANCE LETTER (OUTPATIENT)
Age: 53
End: 2023-09-10

## 2023-10-06 ENCOUNTER — OFFICE VISIT (OUTPATIENT)
Dept: CARDIOLOGY | Facility: CLINIC | Age: 53
End: 2023-10-06
Attending: INTERNAL MEDICINE
Payer: COMMERCIAL

## 2023-10-06 VITALS
WEIGHT: 124.8 LBS | OXYGEN SATURATION: 100 % | BODY MASS INDEX: 20.79 KG/M2 | DIASTOLIC BLOOD PRESSURE: 57 MMHG | HEIGHT: 65 IN | SYSTOLIC BLOOD PRESSURE: 105 MMHG | HEART RATE: 57 BPM

## 2023-10-06 DIAGNOSIS — I47.19 ATRIAL TACHYCARDIA (H): ICD-10-CM

## 2023-10-06 DIAGNOSIS — Q24.9 ADULT CONGENITAL HEART DISEASE: ICD-10-CM

## 2023-10-06 DIAGNOSIS — Q21.11 OSTIUM SECUNDUM TYPE ATRIAL SEPTAL DEFECT: Primary | ICD-10-CM

## 2023-10-06 PROCEDURE — 99205 OFFICE O/P NEW HI 60 MIN: CPT | Mod: 25 | Performed by: INTERNAL MEDICINE

## 2023-10-06 PROCEDURE — G0463 HOSPITAL OUTPT CLINIC VISIT: HCPCS | Performed by: INTERNAL MEDICINE

## 2023-10-06 PROCEDURE — 93005 ELECTROCARDIOGRAM TRACING: CPT

## 2023-10-06 PROCEDURE — 93010 ELECTROCARDIOGRAM REPORT: CPT | Performed by: INTERNAL MEDICINE

## 2023-10-06 ASSESSMENT — ENCOUNTER SYMPTOMS
PALPITATIONS: 1
LIGHT-HEADEDNESS: 0
LEG PAIN: 0
BACK PAIN: 1
SYNCOPE: 0
ARTHRALGIAS: 0
MUSCLE CRAMPS: 1
SLEEP DISTURBANCES DUE TO BREATHING: 0
NECK PAIN: 1
EXERCISE INTOLERANCE: 0
POOR WOUND HEALING: 0
HYPOTENSION: 0
HYPERTENSION: 0
STIFFNESS: 0
SKIN CHANGES: 0
ORTHOPNEA: 0
MUSCLE WEAKNESS: 0
NAIL CHANGES: 0
JOINT SWELLING: 0
MYALGIAS: 1

## 2023-10-06 ASSESSMENT — PAIN SCALES - GENERAL: PAINLEVEL: NO PAIN (0)

## 2023-10-06 NOTE — NURSING NOTE
Chief Complaint   Patient presents with    New Patient     53 year old female with history of ASD (s/p patch repair in 1993 presenting for evaluation.       Vitals were taken, medications reconciled and EKG performed.    Juice Juarez, Facilitator  2:07 PM

## 2023-10-06 NOTE — PATIENT INSTRUCTIONS
You were seen today in the Adult Congenital and Cardiovascular Genetics Clinic at the [facility].    Cardiology Providers you saw during your visit:  Med Garcia MD    Diagnosis:  atrial tachycardia    Results:  Med Garcia MD reviewed the results of your EKG and zio testing today in clinic.    Recommendations for you:    No changes today      General Cardiac Recommendations:  Continue to eat a heart healthy, low salt diet.  Continue to get 20-30 minutes of aerobic activity, 4-5 days per week.  Examples of aerobic activity include walking, running, swimming, cycling, etc.  Continue to observe good oral hygiene, with regular dental visits.      SBE prophylaxis:   Yes____  No__X__    Exercise restrictions:   Yes__X__  No____         If yes, list restrictions:  Must be allowed to rest if fatigued or SOB      FASTING CHOLESTEROL was checked in the last 5 years YES__X_  NO___ (2023)      Follow-up:  Follow up with Dr Garcia in 1 year with a zio patch prior    If you have questions or concerns please contact us at:    Kristel Block RN, BSN   Magaly Johnson (Scheduling)  Nurse Care Coordinator     Clinic   Adult Congenital and CV Genetics   Adult Congenital and CV Genetic  [facility]   [facility]  (P) [phone]     (P) [phone]            (F) [phone]        For after hours urgent needs, call [phone] and ask to speak to the Adult Congenital Physician on call.  Mention Job Code 0401.    For emergencies call 322.    [facility]  Mail Code [id number]  [address] [place]  [address]

## 2023-10-06 NOTE — LETTER
10/6/2023      RE: Lucrecia Connors  [address]   [address]  [facility] [address]       Dear Colleague,    Thank you for the opportunity to participate in the care of your patient, Lucrecia Connors, at the [facility] [place] [facility]. Please see a copy of my visit note below.      ELECTROPHYSIOLOGY CLINIC VISIT    Assessment/Recommendations   Assessment/Plan:    Ms. Connors is a 53 year old female who has a medical history significant for ASD s/p surgical patch repair 1993, PAF. ([id number]), and Crohn's disease.    Paroxysmal Atrial Fibrillation/Flutter:  1. Stroke Prophylaxis:  CHADSVASC=  0, corresponding to a 0.2-0.5% annual stroke / systemic emolism event rate. Indicating NO need for long term oral anticoagulation.    2. Rate Control: Not currently on can use CCB/BB as needed.   3. Rhythm Control: Cardioversion, Antiarrhythmics and/or ablation are options for rhythm control.  Most recent Zio patch monitor showed 3% burden AFL.  Discussed role of medication, including AAT, and/or ablation and role of management.  Given her symptoms are only mild and not overly bothersome to her at this time she is leaning towards conservative measures.   4. Risk Factor Management: Maintain BP control, and NALINI evaluation as indicated.        Follow up in 1 year with Zio patch completed prior.       History of Present Illness/Subjective    Ms. Lucrecia Connors is a 53 year old female who comes in today for EP consultation of AF.    Ms. Connors is a 53 year old female who has a medical history significant for ASD s/p surgical patch repair 1993, PAF. ([id number] 0), and Crohn's disease.    She was initially diagnosed with an ASD after auscultation of a heart murmur prompted an echo which revealed the ASD.  She underwent a surgical patch repair in 1993.  Then in 2022, she developed some palpitations and racing heartbeat.  2-week ambulatory cardiac  monitor showed 2 minutes of PAF.  She feels the symptoms worsen with stress and fatigue.  She otherwise exercises regularly doing walking and HIT.  She has 9 children, 4 of whom have ASD of those 3 have had device closure.       She was continuing to have some intermittent palpitation symptoms not overly severe to her.  A zio patch monitor from 5/23/23-5/30/23 showed 3% AFL up to 1 hour 53 minutes, 57 PSVT up to 7 beats, and occ PACs. 5 symptom activations showed AFL and sinus with and without ectopy. She reports feeling relatively well. She denies chest discomfort, palpitations, abdominal fullness/bloating or peripheral edema, shortness of breath, paroxysmal nocturnal dyspnea, orthopnea, lightheadedness, dizziness, pre-syncope, or syncope. Presenting 12 lead ECG shows SB Vent Rate 58 bpm,  ms,  ms, QTc 445 ms. No current cardiac medications.       I have reviewed and updated the patient's Past Medical History, Social History, Family History and Medication List.     Cardiographics (Personally Reviewed) :   5/23/23 Echo:   ##### CONCLUSIONS #####  Post device closure of secundum atrial septal defect.  No residual atrial level shunt. No obstruction to systemic venous return. Mild  mitral valve insufficiency. Upper mild tricuspid valve insufficiency, the  estimated RV pressures is 22 mmHg above right atrial pressure. There is normal  appearance and motion of the pulmonary and aortic valves. The left and right  ventricles have normal chamber size, wall thickness, and systolic function.  The calculated biplane left ventricular ejection fraction is 59%. No  pericardial effusion.      3/30/18 CMR:  1. The LV is normal in cavity size and wall thickness. The global systolic function is normal. The LVEF is  58%. There are no regional wall motion abnormalities.  2. The RV is borderline dilated in cavity size. The global systolic function is normal. The RVEF is 61%.   3. Both atria are normal in size. There is no  "atrial septal defect.  4. There is no significant valvular disease.  5. Late gadolinium enhancement imaging shows no MI, fibrosis or infiltrative disease.  6. There is no pericardial effusion or thickening.  CONCLUSIONS: Normal LV size and function. Borderline dilated RV with normal function. No myocardial fibrosis.       Physical Examination   /57 (BP Location: Right arm, Patient Position: Chair, Cuff Size: Adult Regular)   Pulse 57   Ht 1.646 m (5' 4.8\")   Wt 56.6 kg (124 lb 12.8 oz)   SpO2 100%   BMI 20.89 kg/m    Wt Readings from Last 3 Encounters:   05/23/23 57.2 kg (126 lb 1.6 oz)   04/24/18 64.9 kg (143 lb)   03/30/18 65.5 kg (144 lb 8 oz)     General Appearance:   Alert, well-appearing and in no acute distress.   HEENT: Atraumatic, normocephalic. PERRL.  MMM.   Chest/Lungs:   Respirations unlabored.  Lungs are clear to auscultation.   Cardiovascular:   Regular rate and rhythm.  S1/S2. No murmur.    Abdomen:  Soft, nontender, nondistended.   Extremities: No cyanosis or clubbing. No edema.    Musculoskeletal: Moves all extremities.  Gait normal.   Skin: Warm, dry, intact.    Neurologic: Mood and affect are appropriate.  Alert and oriented to person, place, time, and situation.          Medications  Allergies   Current Outpatient Medications   Medication Sig Dispense Refill    triamcinolone (KENALOG) 0.1 % external ointment APPLY OINTMENT TOPICALLY TO AFFECTED AREA TWICE DAILY      Allergies   Allergen Reactions    Amoxicillin-Pot Clavulanate     Neomycin-Bacitracin Zn-Polymyx          Lab Results (Personally Reviewed)    Chemistry/lipid CBC Cardiac Enzymes/BNP/TSH/INR   Lab Results   Component Value Date    BUN 18.0 05/23/2023     05/23/2023    CO2 28 05/23/2023     Creatinine   Date Value Ref Range Status   05/23/2023 0.83 0.51 - 0.95 mg/dL Final   03/30/2018 0.83 0.52 - 1.04 mg/dL Final       Lab Results   Component Value Date    CHOL 119 05/23/2023    HDL 57 05/23/2023    LDL 55 05/23/2023 "      Lab Results   Component Value Date    WBC 4.6 05/23/2023    HGB 13.2 05/23/2023    HCT 40.2 05/23/2023    MCV 94 05/23/2023     (L) 05/23/2023    Lab Results   Component Value Date    TSH 1.62 05/23/2023        The patient states understanding and is agreeable with the plan.   Med Garcia MD [facility]  Cardiology - Electrophysiology        Total time spent on patient visit, reviewing notes, imaging, labs, orders, and completing necessary documentation: 60 minutes.

## 2023-10-06 NOTE — NURSING NOTE
Cardiac Monitors: Patient was instructed regarding the indication, function, care and prompt return of a holter  monitor. The monitor was placed on the patient with instructions regarding care of the skin electrodes and monitor, as well as documentation in the patient diary. Patient demonstrated understanding of this information and agreed to call with further questions or concerns.    Med Reconcile: Reviewed and verified all current medications with the patient. The updated medication list was printed and given to the patient.    Return Appointment: Patient given instructions regarding scheduling next clinic visit. Patient demonstrated understanding of this information and agreed to call with further questions or concerns.    Patient stated she understood all health information given and agreed to call with further questions or concerns.

## 2023-10-06 NOTE — PROGRESS NOTES
ELECTROPHYSIOLOGY CLINIC VISIT    Assessment/Recommendations   Assessment/Plan:    Ms. Connors is a 53 year old female who has a medical history significant for ASD s/p surgical patch repair 1993, PAF. ([id number] 0), and Crohn's disease.    Paroxysmal Atrial Fibrillation/Flutter:  1. Stroke Prophylaxis:  CHADSVASC=  0, corresponding to a 0.2-0.5% annual stroke / systemic emolism event rate. Indicating NO need for long term oral anticoagulation.    2. Rate Control: Not currently on can use CCB/BB as needed.   3. Rhythm Control: Cardioversion, Antiarrhythmics and/or ablation are options for rhythm control.  Most recent Zio patch monitor showed 3% burden AFL.  Discussed role of medication, including AAT, and/or ablation and role of management.  Given her symptoms are only mild and not overly bothersome to her at this time she is leaning towards conservative measures.   4. Risk Factor Management: Maintain BP control, and NALINI evaluation as indicated.        Follow up in 1 year with Zio patch completed prior.       History of Present Illness/Subjective    Ms. Lucrecia Connors is a 53 year old female who comes in today for EP consultation of AF.    Ms. Connors is a 53 year old female who has a medical history significant for ASD s/p surgical patch repair 1993, PAF. ([id number]), and Crohn's disease.    She was initially diagnosed with an ASD after auscultation of a heart murmur prompted an echo which revealed the ASD.  She underwent a surgical patch repair in 1993.  Then in 2022, she developed some palpitations and racing heartbeat.  2-week ambulatory cardiac monitor showed 2 minutes of PAF.  She feels the symptoms worsen with stress and fatigue.  She otherwise exercises regularly doing walking and HIT.  She has 9 children, 4 of whom have ASD of those 3 have had device closure.       She was continuing to have some intermittent palpitation symptoms not overly severe to her.  A zio patch monitor from  5/23/23-5/30/23 showed 3% AFL up to 1 hour 53 minutes, 57 PSVT up to 7 beats, and occ PACs. 5 symptom activations showed AFL and sinus with and without ectopy. She reports feeling relatively well. She denies chest discomfort, palpitations, abdominal fullness/bloating or peripheral edema, shortness of breath, paroxysmal nocturnal dyspnea, orthopnea, lightheadedness, dizziness, pre-syncope, or syncope. Presenting 12 lead ECG shows SB Vent Rate 58 bpm,  ms,  ms, QTc 445 ms. No current cardiac medications.       I have reviewed and updated the patient's Past Medical History, Social History, Family History and Medication List.     Cardiographics (Personally Reviewed) :   5/23/23 Echo:   ##### CONCLUSIONS #####  Post device closure of secundum atrial septal defect.  No residual atrial level shunt. No obstruction to systemic venous return. Mild  mitral valve insufficiency. Upper mild tricuspid valve insufficiency, the  estimated RV pressures is 22 mmHg above right atrial pressure. There is normal  appearance and motion of the pulmonary and aortic valves. The left and right  ventricles have normal chamber size, wall thickness, and systolic function.  The calculated biplane left ventricular ejection fraction is 59%. No  pericardial effusion.      3/30/18 CMR:  1. The LV is normal in cavity size and wall thickness. The global systolic function is normal. The LVEF is  58%. There are no regional wall motion abnormalities.  2. The RV is borderline dilated in cavity size. The global systolic function is normal. The RVEF is 61%.   3. Both atria are normal in size. There is no atrial septal defect.  4. There is no significant valvular disease.  5. Late gadolinium enhancement imaging shows no MI, fibrosis or infiltrative disease.  6. There is no pericardial effusion or thickening.  CONCLUSIONS: Normal LV size and function. Borderline dilated RV with normal function. No myocardial fibrosis.       Physical Examination  "  /57 (BP Location: Right arm, Patient Position: Chair, Cuff Size: Adult Regular)   Pulse 57   Ht 1.646 m (5' 4.8\")   Wt 56.6 kg (124 lb 12.8 oz)   SpO2 100%   BMI 20.89 kg/m    Wt Readings from Last 3 Encounters:   05/23/23 57.2 kg (126 lb 1.6 oz)   04/24/18 64.9 kg (143 lb)   03/30/18 65.5 kg (144 lb 8 oz)     General Appearance:   Alert, well-appearing and in no acute distress.   HEENT: Atraumatic, normocephalic. PERRL.  MMM.   Chest/Lungs:   Respirations unlabored.  Lungs are clear to auscultation.   Cardiovascular:   Regular rate and rhythm.  S1/S2. No murmur.    Abdomen:  Soft, nontender, nondistended.   Extremities: No cyanosis or clubbing. No edema.    Musculoskeletal: Moves all extremities.  Gait normal.   Skin: Warm, dry, intact.    Neurologic: Mood and affect are appropriate.  Alert and oriented to person, place, time, and situation.          Medications  Allergies   Current Outpatient Medications   Medication Sig Dispense Refill    triamcinolone (KENALOG) 0.1 % external ointment APPLY OINTMENT TOPICALLY TO AFFECTED AREA TWICE DAILY      Allergies   Allergen Reactions    Amoxicillin-Pot Clavulanate     Neomycin-Bacitracin Zn-Polymyx          Lab Results (Personally Reviewed)    Chemistry/lipid CBC Cardiac Enzymes/BNP/TSH/INR   Lab Results   Component Value Date    BUN 18.0 05/23/2023     05/23/2023    CO2 28 05/23/2023     Creatinine   Date Value Ref Range Status   05/23/2023 0.83 0.51 - 0.95 mg/dL Final   03/30/2018 0.83 0.52 - 1.04 mg/dL Final       Lab Results   Component Value Date    CHOL 119 05/23/2023    HDL 57 05/23/2023    LDL 55 05/23/2023      Lab Results   Component Value Date    WBC 4.6 05/23/2023    HGB 13.2 05/23/2023    HCT 40.2 05/23/2023    MCV 94 05/23/2023     (L) 05/23/2023    Lab Results   Component Value Date    TSH 1.62 05/23/2023        The patient states understanding and is agreeable with the plan.   Med Garcia MD [facility]  Cardiology - " Electrophysiology        Total time spent on patient visit, reviewing notes, imaging, labs, orders, and completing necessary documentation: 60 minutes.

## 2023-10-09 LAB
ATRIAL RATE - MUSE: 58 BPM
DIASTOLIC BLOOD PRESSURE - MUSE: NORMAL MMHG
INTERPRETATION ECG - MUSE: NORMAL
P AXIS - MUSE: -18 DEGREES
PR INTERVAL - MUSE: 172 MS
QRS DURATION - MUSE: 114 MS
QT - MUSE: 454 MS
QTC - MUSE: 445 MS
R AXIS - MUSE: 23 DEGREES
SYSTOLIC BLOOD PRESSURE - MUSE: NORMAL MMHG
T AXIS - MUSE: 57 DEGREES
VENTRICULAR RATE- MUSE: 58 BPM

## 2023-10-26 ENCOUNTER — TELEPHONE (OUTPATIENT)
Dept: CARDIOLOGY | Facility: CLINIC | Age: 53
End: 2023-10-26
Payer: COMMERCIAL

## 2023-10-26 NOTE — TELEPHONE ENCOUNTER
Explained follow up to pt, she may want to be seen with Dr Holder in Feb 2024 due to schedule working better this way, will call us back if she wants to schedule earlier than May

## 2023-10-26 NOTE — TELEPHONE ENCOUNTER
Pt calling in regards to questions about follow up testing with Dr. Holder. Pt would like a call back to discuss. Pt wondering if she needs to get testing done sooner, mentioned recent visit with Dr. Garcia and him mentioning what the testing would be looking for. Pt also mentioned feeling palpitations every day but nothing sever.

## 2024-02-29 ENCOUNTER — TELEPHONE (OUTPATIENT)
Dept: CARDIOLOGY | Facility: CLINIC | Age: 54
End: 2024-02-29
Payer: COMMERCIAL

## 2024-02-29 DIAGNOSIS — Q21.11 OSTIUM SECUNDUM TYPE ATRIAL SEPTAL DEFECT: Primary | ICD-10-CM

## 2024-02-29 DIAGNOSIS — Q24.9 ADULT CONGENITAL HEART DISEASE: ICD-10-CM

## 2024-02-29 DIAGNOSIS — I34.1 MITRAL VALVE PROLAPSE: ICD-10-CM

## 2024-02-29 DIAGNOSIS — I47.19 ATRIAL TACHYCARDIA (H): ICD-10-CM

## 2024-02-29 NOTE — TELEPHONE ENCOUNTER
Health Call Center    Phone Message    May a detailed message be left on voicemail: yes     Reason for Call: Other: Shannon called to schedule her annual congenital visit with Dr. Holder in May. Please reach out to Lucrecia to schedule. Thank you!     Action Taken: Other: Cardiology    Travel Screening: Not Applicable    Thank you!  [facility]

## 2024-03-04 DIAGNOSIS — Q24.9 ADULT CONGENITAL HEART DISEASE: ICD-10-CM

## 2024-03-04 DIAGNOSIS — Q21.10 ASD (ATRIAL SEPTAL DEFECT): Primary | ICD-10-CM

## 2024-03-04 DIAGNOSIS — I34.1 MITRAL VALVE PROLAPSE: ICD-10-CM

## 2024-06-17 ENCOUNTER — ORDERS ONLY (AUTO-RELEASED) (OUTPATIENT)
Dept: CARDIOLOGY | Facility: CLINIC | Age: 54
End: 2024-06-17
Payer: COMMERCIAL

## 2024-06-17 DIAGNOSIS — I34.1 MITRAL VALVE PROLAPSE: ICD-10-CM

## 2024-06-17 DIAGNOSIS — I47.19 ATRIAL TACHYCARDIA (H): ICD-10-CM

## 2024-06-17 DIAGNOSIS — Q24.9 ADULT CONGENITAL HEART DISEASE: ICD-10-CM

## 2024-06-17 DIAGNOSIS — Q21.11 OSTIUM SECUNDUM TYPE ATRIAL SEPTAL DEFECT: ICD-10-CM

## 2024-07-18 ENCOUNTER — MYC MEDICAL ADVICE (OUTPATIENT)
Dept: PEDIATRIC CARDIOLOGY | Facility: CLINIC | Age: 54
End: 2024-07-18
Payer: COMMERCIAL

## 2024-07-18 PROCEDURE — 93244 EXT ECG>48HR<7D REV&INTERPJ: CPT | Performed by: INTERNAL MEDICINE

## 2024-07-22 NOTE — PROGRESS NOTES
ELECTROPHYSIOLOGY CLINIC VISIT    Assessment/Recommendations   Assessment/Plan:    Ms. Connors is a 54 year old female who has a medical history significant for ASD s/p surgical patch repair 1993, PAF. ([id number] 0), and Crohn's disease.    Paroxysmal Atrial Fibrillation/Flutter:  1. Stroke Prophylaxis:  CHADSVASC=  0, corresponding to a 0.2-0.5% annual stroke / systemic emolism event rate. Indicating NO need for long term oral anticoagulation.    2. Rate Control: Not currently on can use CCB/BB as needed.   3. Rhythm Control: Cardioversion, Antiarrhythmics and/or ablation are options for rhythm control.  Most recent Zio patch monitor showed 3% burden AFL/AF.  Discussed role of medication, including AAT, and/or ablation and role of management.  Given her symptoms are only mild and not overly bothersome to her at this time we will continue with conservative measures.   4. Risk Factor Management: Maintain BP control, and NALINI evaluation as indicated.        Follow up in 1 year with Zio patch completed prior.       History of Present Illness/Subjective    Ms. Lucrecia Connors is a 54 year old female who comes in today for EP consultation of AF.    Ms. Connors is a 54 year old female who has a medical history significant for ASD s/p surgical patch repair 1993, PAF. ([id number] 0), and Crohn's disease.    She was initially diagnosed with an ASD after auscultation of a heart murmur prompted an echo which revealed the ASD.  She underwent a surgical patch repair in 1993.  Then in 2022, she developed some palpitations and racing heartbeat.  2-week ambulatory cardiac monitor showed 2 minutes of PAF.  She feels the symptoms worsen with stress and fatigue.  She otherwise exercises regularly doing walking and HIT.  She has 9 children, 4 of whom have ASD of those 3 have had device closure.       EP Visit 10/6/23: She was continuing to have some intermittent palpitation symptoms not overly severe to her.  A zio patch  monitor from 5/23/23-5/30/23 showed 3% AFL up to 1 hour 53 minutes, 57 PSVT up to 7 beats, and occ PACs. 5 symptom activations showed AFL and sinus with and without ectopy. She reports feeling relatively well. She denies chest discomfort, palpitations, abdominal fullness/bloating or peripheral edema, shortness of breath, paroxysmal nocturnal dyspnea, orthopnea, lightheadedness, dizziness, pre-syncope, or syncope. Presenting 12 lead ECG shows SB Vent Rate 58 bpm,  ms,  ms, QTc 445 ms. No current cardiac medications.     She presents today for follow up. She was in [facility] on 7/[facility]/24 with chest pain and palpitations. Troponin was negative. She was apparently in sinus. A zio patch monitor from 6/2024 showed 3% AF burden (stable). Has both AF and typical AFL. She reports feeling heart racing when waking up on 7/23/24. Not too bothered by her AF. She remains fairly active without significant limitations. She reports feeling at baseline. She denies chest discomfort, abdominal fullness/bloating or peripheral edema, shortness of breath, paroxysmal nocturnal dyspnea, orthopnea, lightheadedness, dizziness, pre-syncope, or syncope. Presenting 12 lead ECG shows NSR iRBB Vent Rate 64 bpm,  ms,  ms, QTc 476 ms. No current cardiac medications.       I have reviewed and updated the patient's Past Medical History, Social History, Family History and Medication List.     Cardiographics (Personally Reviewed) :   5/23/23 Echo:   ##### CONCLUSIONS #####  Post device closure of secundum atrial septal defect.  No residual atrial level shunt. No obstruction to systemic venous return. Mild  mitral valve insufficiency. Upper mild tricuspid valve insufficiency, the  estimated RV pressures is 22 mmHg above right atrial pressure. There is normal  appearance and motion of the pulmonary and aortic valves. The left and right  ventricles have normal chamber size, wall thickness, and systolic function.  The calculated  biplane left ventricular ejection fraction is 59%. No  pericardial effusion.      3/30/18 CMR:  1. The LV is normal in cavity size and wall thickness. The global systolic function is normal. The LVEF is  58%. There are no regional wall motion abnormalities.  2. The RV is borderline dilated in cavity size. The global systolic function is normal. The RVEF is 61%.   3. Both atria are normal in size. There is no atrial septal defect.  4. There is no significant valvular disease.  5. Late gadolinium enhancement imaging shows no MI, fibrosis or infiltrative disease.  6. There is no pericardial effusion or thickening.  CONCLUSIONS: Normal LV size and function. Borderline dilated RV with normal function. No myocardial fibrosis.       Physical Examination   /60 (BP Location: Right arm, Patient Position: Chair, Cuff Size: Adult Regular)   Pulse 58   Wt 56.5 kg (124 lb 9.6 oz)   SpO2 99%   BMI 20.86 kg/m    Wt Readings from Last 3 Encounters:   10/06/23 56.6 kg (124 lb 12.8 oz)   05/23/23 57.2 kg (126 lb 1.6 oz)   04/24/18 64.9 kg (143 lb)     General Appearance:   Alert, well-appearing and in no acute distress.   HEENT: Atraumatic, normocephalic. PERRL.  MMM.   Chest/Lungs:   Respirations unlabored.  Lungs are clear to auscultation.   Cardiovascular:   Regular rate and rhythm.  S1/S2. No murmur.    Abdomen:  Soft, nontender, nondistended.   Extremities: No cyanosis or clubbing. No edema.    Musculoskeletal: Moves all extremities.  Gait normal.   Skin: Warm, dry, intact.    Neurologic: Mood and affect are appropriate.  Alert and oriented to person, place, time, and situation.          Medications  Allergies   Current Outpatient Medications   Medication Sig Dispense Refill    triamcinolone (KENALOG) 0.1 % external ointment APPLY OINTMENT TOPICALLY TO AFFECTED AREA TWICE DAILY      Allergies   Allergen Reactions    Amoxicillin-Pot Clavulanate     Neomycin-Bacitracin Zn-Polymyx          Lab Results (Personally Reviewed)     Chemistry/lipid CBC Cardiac Enzymes/BNP/TSH/INR   Lab Results   Component Value Date    BUN 18.0 05/23/2023     05/23/2023    CO2 28 05/23/2023     Creatinine   Date Value Ref Range Status   05/23/2023 0.83 0.51 - 0.95 mg/dL Final   03/30/2018 0.83 0.52 - 1.04 mg/dL Final       Lab Results   Component Value Date    CHOL 119 05/23/2023    HDL 57 05/23/2023    LDL 55 05/23/2023      Lab Results   Component Value Date    WBC 4.6 05/23/2023    HGB 13.2 05/23/2023    HCT 40.2 05/23/2023    MCV 94 05/23/2023     (L) 05/23/2023    Lab Results   Component Value Date    TSH 1.62 05/23/2023        The patient states understanding and is agreeable with the plan.   Med Garcia MD [facility]  Cardiology - Electrophysiology        Total time spent on patient visit, reviewing notes, imaging, labs, orders, and completing necessary documentation: 45 minutes.

## 2024-07-23 NOTE — PATIENT INSTRUCTIONS
Thank you for visiting the Adult Congenital and Cardiovascular Genetics Clinic at the [facility].    Cardiology Providers you saw during your visit:  Oleksandr Holder MD and Med Garcia MD    Diagnosis:  ASD    Results:  Oleksandr Holder MD and Med Garcia MD reviewed the results of your EKG, echo and zio patch  testing today in clinic.    ______________________________________________________________________________    Recommendations from your Cardiology Provider:    Continue to Exercise Regularly      General Cardiac Recommendations:  Continue to eat a heart healthy, low salt diet.  Continue to get 20-30 minutes of aerobic activity, 4-5 days per week.  Examples of aerobic activity include walking, running, swimming, cycling, etc.  Continue to observe good oral hygiene, with regular dental visits.    ____________________________________________________________________________________________________    SBE prophylaxis:   Yes____  No__X__    SBE prophylaxis applies to patients with certain heart conditions who are recommended to take antibiotics before dental appointments and other specific procedures. These antibiotics are to help prevent an infection of the heart (endocarditis) that certain patients are at higher risk of developing. The guidelines used come from the American Heart Association and are periodically updated.  ____________________________________________________________________________________________________    FASTING CHOLESTEROL was checked in the last 5 years YES__X__  NO____ (2023)  If no, please follow up with your primary care physician. You should have a cholesterol screening every 5 years at minimum, and every year if taking a medication for your cholesterol levels.     ____________________________________________________________________________________________________    Follow-up Plan:  Follow up with Dr Holder in 1 year with Echocardiogram and Follow up with Dr Garcia in 1 year.      ____________________________________________________________________________________________________    If you have questions or concerns, please call us at [phone] or contact us through [facility].    Kristel Block RN, BSN    Marah Kelly (Scheduling)  Nurse Care Coordinator     Clinic   Adult Congenital and CV Genetics   Adult Congenital and CV Genetic  [facility]   [facility]  (P) [phone]     (P) [phone]  (F) [phone]     (F) [phone]      For after hours urgent needs, call [phone] and ask to speak to the Adult Congenital Physician on call.  Mention Job Code 0401.    For emergencies call 911.    [facility]  [facility]  Mail Code [id number]  [address] [place]  [address]

## 2024-07-23 NOTE — PATIENT INSTRUCTIONS
Thank you for visiting the Adult Congenital and Cardiovascular Genetics Clinic at the [facility].    Cardiology Providers you saw during your visit:  Oleksandr Holder MD and Med Garcia MD    Diagnosis:  ASD    Results:  Oleksandr Holder MD and Med Garcia MD reviewed the results of your EKG, echo and zio patch  testing today in clinic.    ______________________________________________________________________________    Recommendations from your Cardiology Provider:    Continue to Exercise Regularly      General Cardiac Recommendations:  Continue to eat a heart healthy, low salt diet.  Continue to get 20-30 minutes of aerobic activity, 4-5 days per week.  Examples of aerobic activity include walking, running, swimming, cycling, etc.  Continue to observe good oral hygiene, with regular dental visits.    ____________________________________________________________________________________________________    SBE prophylaxis:   Yes____  No__X__    SBE prophylaxis applies to patients with certain heart conditions who are recommended to take antibiotics before dental appointments and other specific procedures. These antibiotics are to help prevent an infection of the heart (endocarditis) that certain patients are at higher risk of developing. The guidelines used come from the American Heart Association and are periodically updated.  ____________________________________________________________________________________________________    FASTING CHOLESTEROL was checked in the last 5 years YES__X__  NO____ (2023)  If no, please follow up with your primary care physician. You should have a cholesterol screening every 5 years at minimum, and every year if taking a medication for your cholesterol levels.     ____________________________________________________________________________________________________    Follow-up Plan:  Follow up with Dr Holder in 1 year with Echocardiogram and Follow up with Dr Garcia in 1 year.      ____________________________________________________________________________________________________    If you have questions or concerns, please call us at [phone] or contact us through [employer].    Kristel Block RN, BSN    Marah Kelly (Scheduling)  Nurse Care Coordinator     Clinic   Adult Congenital and CV Genetics   Adult Congenital and CV Genetic  [facility]   [facility]  (P) [phone]     (P) [phone]  (F) [phone]     (F) [phone]      For after hours urgent needs, call [phone] and ask to speak to the Adult Congenital Physician on call.  Mention Job Code 0401.    For emergencies call 911.    [facility]  [facility]  Mail Code [id number]  [address] [place]  [address]

## 2024-07-26 ENCOUNTER — OFFICE VISIT (OUTPATIENT)
Dept: CARDIOLOGY | Facility: CLINIC | Age: 54
End: 2024-07-26
Attending: INTERNAL MEDICINE
Payer: COMMERCIAL

## 2024-07-26 ENCOUNTER — OFFICE VISIT (OUTPATIENT)
Dept: CARDIOLOGY | Facility: CLINIC | Age: 54
End: 2024-07-26
Payer: COMMERCIAL

## 2024-07-26 ENCOUNTER — ANCILLARY PROCEDURE (OUTPATIENT)
Dept: CARDIOLOGY | Facility: CLINIC | Age: 54
End: 2024-07-26
Payer: COMMERCIAL

## 2024-07-26 VITALS
DIASTOLIC BLOOD PRESSURE: 60 MMHG | BODY MASS INDEX: 20.86 KG/M2 | OXYGEN SATURATION: 99 % | HEART RATE: 58 BPM | WEIGHT: 124.6 LBS | SYSTOLIC BLOOD PRESSURE: 103 MMHG

## 2024-07-26 VITALS
SYSTOLIC BLOOD PRESSURE: 103 MMHG | WEIGHT: 124.6 LBS | HEART RATE: 58 BPM | DIASTOLIC BLOOD PRESSURE: 60 MMHG | BODY MASS INDEX: 20.86 KG/M2 | OXYGEN SATURATION: 99 %

## 2024-07-26 DIAGNOSIS — I34.1 MITRAL VALVE PROLAPSE: ICD-10-CM

## 2024-07-26 DIAGNOSIS — Q24.9 ADULT CONGENITAL HEART DISEASE: ICD-10-CM

## 2024-07-26 DIAGNOSIS — I47.19 ATRIAL TACHYCARDIA (H): ICD-10-CM

## 2024-07-26 DIAGNOSIS — Q21.10 ASD (ATRIAL SEPTAL DEFECT): ICD-10-CM

## 2024-07-26 DIAGNOSIS — Q21.11 OSTIUM SECUNDUM TYPE ATRIAL SEPTAL DEFECT: ICD-10-CM

## 2024-07-26 DIAGNOSIS — Q21.10 ASD (ATRIAL SEPTAL DEFECT): Primary | ICD-10-CM

## 2024-07-26 PROCEDURE — 93005 ELECTROCARDIOGRAM TRACING: CPT | Performed by: INTERNAL MEDICINE

## 2024-07-26 PROCEDURE — 93320 DOPPLER ECHO COMPLETE: CPT | Performed by: PEDIATRICS

## 2024-07-26 PROCEDURE — 93010 ELECTROCARDIOGRAM REPORT: CPT | Performed by: INTERNAL MEDICINE

## 2024-07-26 PROCEDURE — 99214 OFFICE O/P EST MOD 30 MIN: CPT | Mod: 25

## 2024-07-26 PROCEDURE — 93303 ECHO TRANSTHORACIC: CPT | Performed by: PEDIATRICS

## 2024-07-26 PROCEDURE — 93325 DOPPLER ECHO COLOR FLOW MAPG: CPT | Performed by: PEDIATRICS

## 2024-07-26 PROCEDURE — G0463 HOSPITAL OUTPT CLINIC VISIT: HCPCS | Performed by: INTERNAL MEDICINE

## 2024-07-26 PROCEDURE — 99215 OFFICE O/P EST HI 40 MIN: CPT | Mod: 25 | Performed by: INTERNAL MEDICINE

## 2024-07-26 RX ORDER — GLUTAMINE 100 %
1 POWDER (GRAM) ORAL DAILY
COMMUNITY

## 2024-07-26 RX ORDER — MULTIPLE VITAMINS W/ MINERALS TAB 9MG-400MCG
1 TAB ORAL DAILY
COMMUNITY

## 2024-07-26 RX ORDER — SAME BUTANEDISULFONATE/BETAINE 400-600 MG
10000 POWDER IN PACKET (EA) ORAL
COMMUNITY

## 2024-07-26 ASSESSMENT — PAIN SCALES - GENERAL
PAINLEVEL: NO PAIN (0)
PAINLEVEL: NO PAIN (0)

## 2024-07-26 NOTE — LETTER
7/26/2024      RE: Lucrecia Connors  [address]   [address]  [facility] [place] [address]       Dear Colleague,    Thank you for the opportunity to participate in the care of your patient, Lucrecia Connors, at the [facility] [place] [facility]. Please see a copy of my visit note below.        ELECTROPHYSIOLOGY CLINIC VISIT    Assessment/Recommendations   Assessment/Plan:    Ms. Connors is a 54 year old female who has a medical history significant for ASD s/p surgical patch repair 1993, PAF. ([id number]), and Crohn's disease.    Paroxysmal Atrial Fibrillation/Flutter:  1. Stroke Prophylaxis:  CHADSVASC=  0, corresponding to a 0.2-0.5% annual stroke / systemic emolism event rate. Indicating NO need for long term oral anticoagulation.    2. Rate Control: Not currently on can use CCB/BB as needed.   3. Rhythm Control: Cardioversion, Antiarrhythmics and/or ablation are options for rhythm control.  Most recent Zio patch monitor showed 3% burden AFL/AF.  Discussed role of medication, including AAT, and/or ablation and role of management.  Given her symptoms are only mild and not overly bothersome to her at this time we will continue with conservative measures.   4. Risk Factor Management: Maintain BP control, and NALINI evaluation as indicated.        Follow up in 1 year with Zio patch completed prior.       History of Present Illness/Subjective    Ms. Lucrecia Connors is a 54 year old female who comes in today for EP consultation of AF.    Ms. Connors is a 54 year old female who has a medical history significant for ASD s/p surgical patch repair 1993, PAF. ([id number]), and Crohn's disease.    She was initially diagnosed with an ASD after auscultation of a heart murmur prompted an echo which revealed the ASD.  She underwent a surgical patch repair in 1993.  Then in 2022, she developed some palpitations and racing heartbeat.  2-week ambulatory cardiac  monitor showed 2 minutes of PAF.  She feels the symptoms worsen with stress and fatigue.  She otherwise exercises regularly doing walking and HIT.  She has 9 children, 4 of whom have ASD of those 3 have had device closure.       EP Visit 10/6/23: She was continuing to have some intermittent palpitation symptoms not overly severe to her.  A zio patch monitor from 5/23/23-5/30/23 showed 3% AFL up to 1 hour 53 minutes, 57 PSVT up to 7 beats, and occ PACs. 5 symptom activations showed AFL and sinus with and without ectopy. She reports feeling relatively well. She denies chest discomfort, palpitations, abdominal fullness/bloating or peripheral edema, shortness of breath, paroxysmal nocturnal dyspnea, orthopnea, lightheadedness, dizziness, pre-syncope, or syncope. Presenting 12 lead ECG shows SB Vent Rate 58 bpm,  ms,  ms, QTc 445 ms. No current cardiac medications.     She presents today for follow up. She was in ER at [facility] on 7/5/24 with chest pain and palpitations. Troponin was negative. She was apparently in sinus. A zio patch monitor from 6/2024 showed 3% AF burden (stable). Has both AF and typical AFL. She reports feeling heart racing when waking up on 7/23/24. Not too bothered by her AF. She remains fairly active without significant limitations. She reports feeling at baseline. She denies chest discomfort, abdominal fullness/bloating or peripheral edema, shortness of breath, paroxysmal nocturnal dyspnea, orthopnea, lightheadedness, dizziness, pre-syncope, or syncope. Presenting 12 lead ECG shows NSR iRBB Vent Rate 64 bpm,  ms,  ms, QTc 476 ms. No current cardiac medications.       I have reviewed and updated the patient's Past Medical History, Social History, Family History and Medication List.     Cardiographics (Personally Reviewed) :   5/23/23 Echo:   ##### CONCLUSIONS #####  Post device closure of secundum atrial septal defect.  No residual atrial level shunt. No obstruction to systemic  venous return. Mild  mitral valve insufficiency. Upper mild tricuspid valve insufficiency, the  estimated RV pressures is 22 mmHg above right atrial pressure. There is normal  appearance and motion of the pulmonary and aortic valves. The left and right  ventricles have normal chamber size, wall thickness, and systolic function.  The calculated biplane left ventricular ejection fraction is 59%. No  pericardial effusion.      3/30/18 CMR:  1. The LV is normal in cavity size and wall thickness. The global systolic function is normal. The LVEF is  58%. There are no regional wall motion abnormalities.  2. The RV is borderline dilated in cavity size. The global systolic function is normal. The RVEF is 61%.   3. Both atria are normal in size. There is no atrial septal defect.  4. There is no significant valvular disease.  5. Late gadolinium enhancement imaging shows no MI, fibrosis or infiltrative disease.  6. There is no pericardial effusion or thickening.  CONCLUSIONS: Normal LV size and function. Borderline dilated RV with normal function. No myocardial fibrosis.       Physical Examination   /60 (BP Location: Right arm, Patient Position: Chair, Cuff Size: Adult Regular)   Pulse 58   Wt 56.5 kg (124 lb 9.6 oz)   SpO2 99%   BMI 20.86 kg/m    Wt Readings from Last 3 Encounters:   10/06/23 56.6 kg (124 lb 12.8 oz)   05/23/23 57.2 kg (126 lb 1.6 oz)   04/24/18 64.9 kg (143 lb)     General Appearance:   Alert, well-appearing and in no acute distress.   HEENT: Atraumatic, normocephalic. PERRL.  MMM.   Chest/Lungs:   Respirations unlabored.  Lungs are clear to auscultation.   Cardiovascular:   Regular rate and rhythm.  S1/S2. No murmur.    Abdomen:  Soft, nontender, nondistended.   Extremities: No cyanosis or clubbing. No edema.    Musculoskeletal: Moves all extremities.  Gait normal.   Skin: Warm, dry, intact.    Neurologic: Mood and affect are appropriate.  Alert and oriented to person, place, time, and situation.           Medications  Allergies   Current Outpatient Medications   Medication Sig Dispense Refill     triamcinolone (KENALOG) 0.1 % external ointment APPLY OINTMENT TOPICALLY TO AFFECTED AREA TWICE DAILY      Allergies   Allergen Reactions     Amoxicillin-Pot Clavulanate      Neomycin-Bacitracin Zn-Polymyx          Lab Results (Personally Reviewed)    Chemistry/lipid CBC Cardiac Enzymes/BNP/TSH/INR   Lab Results   Component Value Date    BUN 18.0 05/23/2023     05/23/2023    CO2 28 05/23/2023     Creatinine   Date Value Ref Range Status   05/23/2023 0.83 0.51 - 0.95 mg/dL Final   03/30/2018 0.83 0.52 - 1.04 mg/dL Final       Lab Results   Component Value Date    CHOL 119 05/23/2023    HDL 57 05/23/2023    LDL 55 05/23/2023      Lab Results   Component Value Date    WBC 4.6 05/23/2023    HGB 13.2 05/23/2023    HCT 40.2 05/23/2023    MCV 94 05/23/2023     (L) 05/23/2023    Lab Results   Component Value Date    TSH 1.62 05/23/2023        The patient states understanding and is agreeable with the plan.   Med Garcia MD [facility]  Cardiology - Electrophysiology        Total time spent on patient visit, reviewing notes, imaging, labs, orders, and completing necessary documentation: 45 minutes.                      Please do not hesitate to contact me if you have any questions/concerns.     Sincerely,     Med Garcia MD

## 2024-07-26 NOTE — NURSING NOTE
Chief Complaint   Patient presents with    Follow Up     ACHD Visit Type: (Jose/Gallo) ACHD 7/26/2024: 54 year old female with history of ASD (s/p patch repair in 1993 presenting for evaluation.       Vitals were taken, medications reconciled, and EKG was performed.    Champ Galindo, EMT  2:14 PM

## 2024-07-26 NOTE — PROGRESS NOTES
Adult Congenital Heart Disease Clinic Visit    HPI:    Lucrecia Connors is a 54-year-old female who is s/p surgical patch repair of an atrial septal defect in 1993 at 23 years of age at [facility]. She was initially diagnosed after auscultation of heart murmur prompted an echocardiogram which revealed her atrial septal defect.  Her surgical course was uncomplicated. She was seen in ACHD clinic in 2018 by Dr. Barnard and I met her in clinic in 2022. She is here today for a follow up visit and is seeing Dr. Garcia from  as well.    Lucrecia has had persistent palpitations particularly at rest. She noted a severe episode on July 5th with sustained tachycardia and chest pain that brought her to the ER. She converted with vomiting. Palpitations are worse than stress and fatigue. After conversation with Dr. Garcia today she has decided not to pursue mediction or ablation and to continue to monitor. Lucrecia has Chrohns disease and is very sensitive to medications. Lucrecia is very active at home and does walk regularly. Was doing HIIT exercises in the past. No LOC, chest pain with exercise. Does have mild LE edema. Still has GI disturbance from ASA given in ER during visit.       Lucrecia has Crohns disease Crohn's colitis which manages with diet.     From prior visits: Lucrecia has 9 children, 4 of whom have atrial septal defects. Per her prior [facility] cardiologist, There has been a discussion with a genetic counselor in the past regarding genetic testing and it has been deferred to date. Three children have had device closure, one has not had intervention.     Lucrecia denies any chest pain, shortness of breath, dyspnea on exertion, paroxysmal nocturnal dyspnea, orthopnea or syncope.  She does have palpitations and intermittent racing heart beat, She does not want to try beta blocker therapy.     PAST MEDICAL HISTORY:  Past Medical History:   Diagnosis Date    ASD (atrial septal defect)     s/p patch repair in 1993     Contact dermatitis and other eczema, due to unspecified cause    - Chron's Disease    Family History   Problem Relation Age of Onset    Heart Disease Son         ASD    Heart Disease Daughter         ASD    Heart Disease Son         ASD     CURRENT MEDICATIONS:  Current Outpatient Medications   Medication Sig Dispense Refill    Ascorbic Acid (VITAMIN C) 500 MG CHEW Take 1,000 mg by mouth      Glutamine POWD powder Take 1 Scoop by mouth daily      multivitamin w/minerals (THERA-VIT-M) tablet Take 1 tablet by mouth daily      Omega-3 Fatty Acids (OMEGA III EPA+DHA PO) Take 1,000 mg by mouth      triamcinolone (KENALOG) 0.1 % external ointment APPLY OINTMENT TOPICALLY TO AFFECTED AREA TWICE DAILY      Vitamin D-Vitamin K (K2-D3 10,000) CAPS Take 10,000 Units/day by mouth       No current facility-administered medications for this visit.     NKDA       EXAM:  Vitals: /60 (BP Location: Right arm, Patient Position: Chair, Cuff Size: Adult Regular)   Pulse 58   Wt 56.5 kg (124 lb 9.6 oz)   SpO2 99%   BMI 20.86 kg/m    General: appears comfortable, alert  Head: normocephalic, atraumatic  Eyes: anicteric sclera, EOMI  Neck: no adenopathy or masses, 2+ carotids without bruits  Orophyarynx: moist mucosa, no lesions, dentition intact  Heart: regular, S1/S2, no murmurs gallop, or rub,   Lungs: clear, no rales or wheezing  Abdomen: soft, non-tender, bowel sounds present, no hepatomegaly  Extremities: no clubbing, cyanosis or edema  Neurological: normal speech and affect, no gross motor deficits    ECG 7/26/24: SR 64 bpm rsR' V1 consistent with h/o ASD. No atrial enlargement.     Echo today:   No residual atrial level shunt. No obstruction to systemic venous return. Mild  mitral valve insufficiency. Upper mild tricuspid valve insufficiency, the  estimated RV pressures is 25 mmHg above right atrial pressure. There is normal appearance and motion of the pulmonary and aortic valves. The left and right ventricles have  normal chamber size, wall thickness, and systolic function. No pericardial effusion.    Atrioventricular valves:  The tricuspid valve is normal in appearance and motion. Upper mild (1-2+)  tricuspid valve insufficiency. Estimated right ventricular systolic pressure  is 25 mmHg plus right atrial pressure. The mitral valve is normal in  appearance and motion. Mild (1+) mitral valve insufficiency.    6/25/23: zio patch holter shows multiple short episodes of atrial tachycardia and PAC's and PVC's that are symptomatic.    MRI (3/30/18):  Normal left ventricular systolic function, normal right ventricular systolic function, borderline right ventricular enlargement is present.  Mitral valve prolapse is present without significant regurgitation.    LAbs 2023 show mild elevation of LFT's , plt count slightly low, Normal hemoglobin and red cell indices.     Results for orders placed or performed in visit on 07/26/24   EKG 12-lead, tracing only (Same Day)     Status: None (Preliminary result)   Result Value Ref Range    Systolic Blood Pressure  mmHg    Diastolic Blood Pressure  mmHg    Ventricular Rate 64 BPM    Atrial Rate 64 BPM    OK Interval 164 ms    QRS Duration 104 ms     ms    QTc 476 ms    P Axis -29 degrees    R AXIS 22 degrees    T Axis 48 degrees    Interpretation ECG       Sinus rhythm  Incomplete right bundle branch block  Borderline ECG  When compared with ECG of 06-Oct-2023 13:57,  No significant change was found     Results for orders placed or performed in visit on 07/26/24   Echo Congenital Adult     Status: None    Narrative    [id number]  [id number]  [id number]  [id number]^[email or web address]^RAMY^BRENDON                                                               Study ID: [id number]                                                 [facility]'[address]                                                 [place] [address]                                                Phone: [phone]                                Pediatric Echocardiogram  ______________________________________________________________________________  Name: MARTHA DOE  Study Date: 2024 01:05 PM                     Patient Location: [facility]  MRN: [id number]                                     Age: 54 yrs  : 1970                                     BP: 104/58 mmHg  Gender: Female                                      HR: 59  Patient Class: Outpatient                           Height: 162 cm  Ordering Provider: RAMY LEDEZMA                Weight: 57 kg  Referring Provider: RAMY LEDEZMA               BSA: 1.6 m2  Performed By: Indiana Leiva  Report approved by: Damaris Ferris MD  Reason For Study: ASD (atrial septal defect), Adult congenital heart disease,  Mitr  ______________________________________________________________________________  ##### CONCLUSIONS #####  Post device closure of secundum atrial septal defect.     No residual atrial level shunt. No obstruction to systemic venous return. Mild  mitral valve insufficiency. Upper mild tricuspid valve insufficiency, the  estimated RV pressures is 25 mmHg above right atrial pressure. There is normal  appearance and motion of the pulmonary and aortic valves. The left and right  ventricles have normal chamber size, wall thickness, and systolic function. No  pericardial effusion.  ______________________________________________________________________________  Technical information:  A complete two dimensional, MMODE, spectral and color Doppler transthoracic  echocardiogram is performed. Images are obtained from parasternal, apical,  subcostal and suprasternal notch views. The study quality is fair. Prior  echocardiogram available for comparison. Frequent PACs noted.     Segmental Anatomy:  There is normal atrial arrangement, with concordant  atrioventricular and  ventriculoarterial connections.     Systemic and pulmonary veins:  The systemic venous return is normal. Normal coronary sinus. Color flow  demonstrates flow from at least one pulmonary vein entering the left atrium.     Atria and atrial septum:  Normal right atrial size. The left atrium is normal in size. Post device  closure of secundum atrial septal defect. No residual atrial level shunt.     Atrioventricular valves:  The tricuspid valve is normal in appearance and motion. Upper mild (1-2+)  tricuspid valve insufficiency. Estimated right ventricular systolic pressure  is 25 mmHg plus right atrial pressure. The mitral valve is normal in  appearance and motion. Mild (1+) mitral valve insufficiency.     Ventricles and Ventricular Septum:  The left and right ventricles have normal chamber size, wall thickness, and  systolic function.     Outflow tracts:  Normal great artery relationship. There is unobstructed flow through the right  ventricular outflow tract. The pulmonary valve motion is normal. There is  normal flow across the pulmonary valve. Trivial pulmonary valve insufficiency.  There is unobstructed flow through the left ventricular outflow tract.  Tricuspid aortic valve with normal appearance and motion. There is normal flow  across the aortic valve.     Great arteries:  The main pulmonary artery has normal appearance. There is unobstructed flow in  the main pulmonary artery. The pulmonary artery bifurcation is normal. There  is unobstructed flow in both branch pulmonary arteries. The aortic arch  appears normal. There is unobstructed antegrade flow in the ascending,  transverse arch, descending thoracic and abdominal aorta.     Coronaries:  The coronary arteries are not well visualized.     Effusions, catheters, cannulas and leads:  No pericardial effusion.     MMode/2D Measurements & Calculations  LA dimension: 3.7 cm     Doppler Measurements & Calculations  MV E max ruben: 71.6 cm/sec                Ao V2 max: 103.3 cm/sec  MV A max ruben: 38.6 cm/sec               Ao max P.3 mmHg  MV E/A: 1.9  LV V1 max: 74.6 cm/sec                  PA V2 max: 66.8 cm/sec  LV V1 max P.2 mmHg                  PA max P.8 mmHg  TR max ruben: 230.4 cm/sec                LPA max ruben: 67.7 cm/sec  TR max P.4 mmHg                    LPA max P.8 mmHg                                          RPA max ruben: 64.7 cm/sec                                          RPA max P.7 mmHg     desc Ao max ruben: 93.8 cm/sec              MPA max ruben: 96.8 cm/sec  desc Ao max PG: 3.5 mmHg                  MPA max PG: 3.8 mmHg     [place] Z-Scores (Measurements & Calculations)  Measurement NameValue  Z-ScorePredictedNormal Range  IVSd(MM)        0.82 cm-0.59  0.90     0.64 - 1.17  LVIDd(MM)       5.4 cm 1.8    4.8      4.1 - 5.5  LVIDs(MM)       3.4 cm 0.96   3.1      2.4 - 3.7  FS(MM)          37.3 % 0.67   34.1     26.4 - 44.2     Report approved by: Estrella Florian 2024 01:51 PM           Assessment and Plan:    Mrs. Connors is a 54-year-old female with past medical history of a atrial septal defect status post surgical repair. She has been bothered over the last couple of years by racing heart rate and palpitations, with an ER visit for symptoms with chest pain in 2024.  She met with Dr. Gifford today and does not want to pursue medication or ablation at the present time. Please see Dr. Garcia's note for further details. She does have mild mitral valve regurgitation which is stable today.     There are no signs of pulmonary hypertension on most recent echocardiogram. She has no overt signs of CHF. LE edema may be due to low albumin level. She does have significant GI invovlement with Crohns disease including one month of chronic diarrhea and abdominal discomfort. Concern for poor nutrient absorption and systemic inflammatory disease.    Mrs. Connors has declined genetics testing in the past. Given the  high prevalence in her offspring 4/9 it is likely that there is a significant genetic component. Testing is always available to Lucrecia or her children.      Recommendations:    Continue to get regular exercise  Increase intake of protein (lean or plant based as per GI needs).   See EP note for plan for palpitations  Call if new concerns or more sustained tachycardia  Follow up one year with repeat echo and ECG or if new concerns or symptoms.     Oleksandr Holder M.D.   of Pediatrics  Pediatric and Adult Congenital Cardiology  [facility]  Pediatric Cardiology Office [phone]  Adult Congenital Cardiology Triage and Scheduling [phone]      A total of 30 minutes were spent in personal review of testing, including echo and ecg, review of documented history of ER visit, additional history from spouse, face to face visit with discussion of findings and plan, and documentation.

## 2024-07-26 NOTE — LETTER
7/26/2024      RE: Lucrecia Connors  [address]   [address]  [facility] [address]       Dear Colleague,    Thank you for the opportunity to participate in the care of your patient, Lucrecia Connors, at the [facility] [place] [facility]. Please see a copy of my visit note below.    Adult Congenital Heart Disease Clinic Visit    HPI:    Lucrecia Connors is a 54-year-old female who is s/p surgical patch repair of an atrial septal defect in 1993 at 23 years of age at [facility]. She was initially diagnosed after auscultation of heart murmur prompted an echocardiogram which revealed her atrial septal defect.  Her surgical course was uncomplicated. She was seen in [facility] clinic in 2018 by Dr. Barnard and I met her in clinic in 2022. She is here today for a follow up visit and is seeing Dr. Garcia from EP as well.    Lucrecia has had persistent palpitations particularly at rest. She noted a severe episode on July 5th with sustained tachycardia and chest pain that brought her to the ER. She converted with vomiting. Palpitations are worse than stress and fatigue. After conversation with Dr. Garcia today she has decided not to pursue mediction or ablation and to continue to monitor. Lucrecia has Chrohns disease and is very sensitive to medications. Lucrecia is very active at home and does walk regularly. Was doing HIIT exercises in the past. No LOC, chest pain with exercise. Does have mild LE edema. Still has GI disturbance from ASA given in ER during visit.       Lucrecia has Crohns disease Crohn's colitis which manages with diet.     From prior visits: Lucrecia has 9 children, 4 of whom have atrial septal defects. [facility] There has been a discussion with a genetic counselor in the past regarding genetic testing and it has been deferred to date. Three children have had device closure, one has not had intervention.      Lucrecia denies any chest pain, shortness of breath, dyspnea on exertion, paroxysmal nocturnal dyspnea, orthopnea or syncope.  She does have palpitations and intermittent racing heart beat, She does not want to try beta blocker therapy.     PAST MEDICAL HISTORY:  Past Medical History:   Diagnosis Date     ASD (atrial septal defect)     s/p patch repair in 1993     Contact dermatitis and other eczema, due to unspecified cause    - Chron's Disease    Family History   Problem Relation Age of Onset     Heart Disease Son         ASD     Heart Disease Daughter         ASD     Heart Disease Son         ASD     CURRENT MEDICATIONS:  Current Outpatient Medications   Medication Sig Dispense Refill     Ascorbic Acid (VITAMIN C) 500 MG CHEW Take 1,000 mg by mouth       Glutamine POWD powder Take 1 Scoop by mouth daily       multivitamin w/minerals (THERA-VIT-M) tablet Take 1 tablet by mouth daily       Omega-3 Fatty Acids (OMEGA III EPA+DHA PO) Take 1,000 mg by mouth       triamcinolone (KENALOG) 0.1 % external ointment APPLY OINTMENT TOPICALLY TO AFFECTED AREA TWICE DAILY       Vitamin D-Vitamin K (K2-D3 10,000) CAPS Take 10,000 Units/day by mouth       No current facility-administered medications for this visit.     NKDA       EXAM:  Vitals: /60 (BP Location: Right arm, Patient Position: Chair, Cuff Size: Adult Regular)   Pulse 58   Wt 56.5 kg (124 lb 9.6 oz)   SpO2 99%   BMI 20.86 kg/m    General: appears comfortable, alert  Head: normocephalic, atraumatic  Eyes: anicteric sclera, EOMI  Neck: no adenopathy or masses, 2+ carotids without bruits  Orophyarynx: moist mucosa, no lesions, dentition intact  Heart: regular, S1/S2, no murmurs gallop, or rub,   Lungs: clear, no rales or wheezing  Abdomen: soft, non-tender, bowel sounds present, no hepatomegaly  Extremities: no clubbing, cyanosis or edema  Neurological: normal speech and affect, no gross motor deficits    ECG 7/26/24: SR 64 bpm rsR' V1 consistent with h/o  ASD. No atrial enlargement.     Echo today:   No residual atrial level shunt. No obstruction to systemic venous return. Mild  mitral valve insufficiency. Upper mild tricuspid valve insufficiency, the  estimated RV pressures is 25 mmHg above right atrial pressure. There is normal appearance and motion of the pulmonary and aortic valves. The left and right ventricles have normal chamber size, wall thickness, and systolic function. No pericardial effusion.    Atrioventricular valves:  The tricuspid valve is normal in appearance and motion. Upper mild (1-2+)  tricuspid valve insufficiency. Estimated right ventricular systolic pressure  is 25 mmHg plus right atrial pressure. The mitral valve is normal in  appearance and motion. Mild (1+) mitral valve insufficiency.    6/25/23: zio patch holter shows multiple short episodes of atrial tachycardia and PAC's and PVC's that are symptomatic.    MRI (3/30/18):  Normal left ventricular systolic function, normal right ventricular systolic function, borderline right ventricular enlargement is present.  Mitral valve prolapse is present without significant regurgitation.    LAbs 2023 show mild elevation of LFT's , plt count slightly low, Normal hemoglobin and red cell indices.     Results for orders placed or performed in visit on 07/26/24   EKG 12-lead, tracing only (Same Day)     Status: None (Preliminary result)   Result Value Ref Range    Systolic Blood Pressure  mmHg    Diastolic Blood Pressure  mmHg    Ventricular Rate 64 BPM    Atrial Rate 64 BPM    CA Interval 164 ms    QRS Duration 104 ms     ms    QTc 476 ms    P Axis -29 degrees    R AXIS 22 degrees    T Axis 48 degrees    Interpretation ECG       Sinus rhythm  Incomplete right bundle branch block  Borderline ECG  When compared with ECG of 06-Oct-2023 13:57,  No significant change was found     Results for orders placed or performed in visit on 07/26/24   Echo Congenital Adult     Status: None    Narrative     [id number]  [id number]  [id number]  [id number]^DARIEN^RAMY^BRENDON                                                               Study ID: [id number]                                                 [facility] [address]                                                [place] [address]                                                Phone: [phone]                                Pediatric Echocardiogram  ______________________________________________________________________________  Name: MARTHA DOE  Study Date: 2024 01:05 PM                     Patient Location: [facility]  MRN: [id number]                                     Age: 54 yrs  : 1970                                     BP: 104/58 mmHg  Gender: Female                                      HR: 59  Patient Class: Outpatient                           Height: 162 cm  Ordering Provider: RAMY LEDEZMA                Weight: 57 kg  Referring Provider: RAMY LEDEZMA               BSA: 1.6 m2  Performed By: Indiana Leiva  Report approved by: Damaris Ferris MD  Reason For Study: ASD (atrial septal defect), Adult congenital heart disease,  Mitr  ______________________________________________________________________________  ##### CONCLUSIONS #####  Post device closure of secundum atrial septal defect.     No residual atrial level shunt. No obstruction to systemic venous return. Mild  mitral valve insufficiency. Upper mild tricuspid valve insufficiency, the  estimated RV pressures is 25 mmHg above right atrial pressure. There is normal  appearance and motion of the pulmonary and aortic valves. The left and right  ventricles have normal chamber size, wall thickness, and systolic function. No  pericardial effusion.  ______________________________________________________________________________  Technical  information:  A complete two dimensional, MMODE, spectral and color Doppler transthoracic  echocardiogram is performed. Images are obtained from parasternal, apical,  subcostal and suprasternal notch views. The study quality is fair. Prior  echocardiogram available for comparison. Frequent PACs noted.     Segmental Anatomy:  There is normal atrial arrangement, with concordant atrioventricular and  ventriculoarterial connections.     Systemic and pulmonary veins:  The systemic venous return is normal. Normal coronary sinus. Color flow  demonstrates flow from at least one pulmonary vein entering the left atrium.     Atria and atrial septum:  Normal right atrial size. The left atrium is normal in size. Post device  closure of secundum atrial septal defect. No residual atrial level shunt.     Atrioventricular valves:  The tricuspid valve is normal in appearance and motion. Upper mild (1-2+)  tricuspid valve insufficiency. Estimated right ventricular systolic pressure  is 25 mmHg plus right atrial pressure. The mitral valve is normal in  appearance and motion. Mild (1+) mitral valve insufficiency.     Ventricles and Ventricular Septum:  The left and right ventricles have normal chamber size, wall thickness, and  systolic function.     Outflow tracts:  Normal great artery relationship. There is unobstructed flow through the right  ventricular outflow tract. The pulmonary valve motion is normal. There is  normal flow across the pulmonary valve. Trivial pulmonary valve insufficiency.  There is unobstructed flow through the left ventricular outflow tract.  Tricuspid aortic valve with normal appearance and motion. There is normal flow  across the aortic valve.     Great arteries:  The main pulmonary artery has normal appearance. There is unobstructed flow in  the main pulmonary artery. The pulmonary artery bifurcation is normal. There  is unobstructed flow in both branch pulmonary arteries. The aortic arch  appears normal.  There is unobstructed antegrade flow in the ascending,  transverse arch, descending thoracic and abdominal aorta.     Coronaries:  The coronary arteries are not well visualized.     Effusions, catheters, cannulas and leads:  No pericardial effusion.     MMode/2D Measurements & Calculations  LA dimension: 3.7 cm     Doppler Measurements & Calculations  MV E max ruben: 71.6 cm/sec               Ao V2 max: 103.3 cm/sec  MV A max ruben: 38.6 cm/sec               Ao max P.3 mmHg  MV E/A: 1.9  LV V1 max: 74.6 cm/sec                  PA V2 max: 66.8 cm/sec  LV V1 max P.2 mmHg                  PA max P.8 mmHg  TR max ruben: 230.4 cm/sec                LPA max ruben: 67.7 cm/sec  TR max P.4 mmHg                    LPA max P.8 mmHg                                          RPA max ruben: 64.7 cm/sec                                          RPA max P.7 mmHg     desc Ao max ruben: 93.8 cm/sec              MPA max ruben: 96.8 cm/sec  desc Ao max PG: 3.5 mmHg                  MPA max PG: 3.8 mmHg     [place] Z-Scores (Measurements & Calculations)  Measurement NameValue  Z-ScorePredictedNormal Range  IVSd(MM)        0.82 cm-0.59  0.90     0.64 - 1.17  LVIDd(MM)       5.4 cm 1.8    4.8      4.1 - 5.5  LVIDs(MM)       3.4 cm 0.96   3.1      2.4 - 3.7  FS(MM)          37.3 % 0.67   34.1     26.4 - 44.2     Report approved by: Estrella Florian 2024 01:51 PM           Assessment and Plan:    Mrs. Connors is a 54-year-old female with past medical history of a atrial septal defect status post surgical repair. She has been bothered over the last couple of years by racing heart rate and palpitations, with an ER visit for symptoms with chest pain in 2024.  She met with Dr. Gifford today and does not want to pursue medication or ablation at the present time. Please see Dr. Garcia's note for further details. She does have mild mitral valve regurgitation which is stable today.     There are no signs of pulmonary  hypertension on most recent echocardiogram. She has no overt signs of CHF. LE edema may be due to low albumin level. She does have significant GI invovlement with Crohns disease including one month of chronic diarrhea and abdominal discomfort. Concern for poor nutrient absorption and systemic inflammatory disease.    Mrs. Connors has declined genetics testing in the past. Given the high prevalence in her offspring 4/9 it is likely that there is a significant genetic component. Testing is always available to Lucrecia or her children.      Recommendations:    Continue to get regular exercise  Increase intake of protein (lean or plant based as per GI needs).   See EP note for plan for palpitations  Call if new concerns or more sustained tachycardia  Follow up one year with repeat echo and ECG or if new concerns or symptoms.     Oleksandr Holder M.D.   of Pediatrics  Pediatric and Adult Congenital Cardiology  [facility]  Pediatric Cardiology Office [phone]  Adult Congenital Cardiology Triage and Scheduling [phone]      A total of 30 minutes were spent in personal review of testing, including echo and ecg, review of documented history of ER visit, additional history from spouse, face to face visit with discussion of findings and plan, and documentation.                    Please do not hesitate to contact me if you have any questions/concerns.     Sincerely,     Oleksandr Holder MD

## 2024-07-29 LAB
ATRIAL RATE - MUSE: 64 BPM
DIASTOLIC BLOOD PRESSURE - MUSE: NORMAL MMHG
INTERPRETATION ECG - MUSE: NORMAL
P AXIS - MUSE: -29 DEGREES
PR INTERVAL - MUSE: 164 MS
QRS DURATION - MUSE: 104 MS
QT - MUSE: 462 MS
QTC - MUSE: 476 MS
R AXIS - MUSE: 22 DEGREES
SYSTOLIC BLOOD PRESSURE - MUSE: NORMAL MMHG
T AXIS - MUSE: 48 DEGREES
VENTRICULAR RATE- MUSE: 64 BPM

## 2024-08-25 ENCOUNTER — HEALTH MAINTENANCE LETTER (OUTPATIENT)
Age: 54
End: 2024-08-25

## 2024-11-03 ENCOUNTER — HEALTH MAINTENANCE LETTER (OUTPATIENT)
Age: 54
End: 2024-11-03

## 2025-05-29 ENCOUNTER — TELEPHONE (OUTPATIENT)
Dept: CARDIOLOGY | Facility: CLINIC | Age: 55
End: 2025-05-29
Payer: COMMERCIAL

## 2025-05-29 DIAGNOSIS — Q21.10 ASD (ATRIAL SEPTAL DEFECT): ICD-10-CM

## 2025-05-29 DIAGNOSIS — I48.91 ATRIAL FIBRILLATION, UNSPECIFIED TYPE (H): Primary | ICD-10-CM

## 2025-05-29 NOTE — TELEPHONE ENCOUNTER
Spoke with patient, she is not sure she wants to wear the Zio due to cost but will think about it. Writer sent Zio put for end of Aug and if pt wants to wait to wear it she can and will not get charged for it. Patient verbalized understanding and is in agreement to the plan.

## 2025-08-29 ENCOUNTER — ORDERS ONLY (AUTO-RELEASED) (OUTPATIENT)
Dept: CARDIOLOGY | Facility: CLINIC | Age: 55
End: 2025-08-29
Payer: COMMERCIAL

## 2025-08-29 DIAGNOSIS — I48.91 ATRIAL FIBRILLATION, UNSPECIFIED TYPE (H): ICD-10-CM

## 2025-08-29 DIAGNOSIS — Q21.10 ASD (ATRIAL SEPTAL DEFECT): ICD-10-CM
